# Patient Record
Sex: FEMALE | Race: BLACK OR AFRICAN AMERICAN | NOT HISPANIC OR LATINO | Employment: FULL TIME | URBAN - METROPOLITAN AREA
[De-identification: names, ages, dates, MRNs, and addresses within clinical notes are randomized per-mention and may not be internally consistent; named-entity substitution may affect disease eponyms.]

---

## 2017-03-08 ENCOUNTER — HOSPITAL ENCOUNTER (EMERGENCY)
Facility: HOSPITAL | Age: 47
Discharge: HOME | End: 2017-03-08
Attending: EMERGENCY MEDICINE
Payer: MEDICAID

## 2017-03-08 VITALS
DIASTOLIC BLOOD PRESSURE: 74 MMHG | OXYGEN SATURATION: 100 % | RESPIRATION RATE: 16 BRPM | TEMPERATURE: 98 F | SYSTOLIC BLOOD PRESSURE: 143 MMHG | BODY MASS INDEX: 36.57 KG/M2 | HEART RATE: 88 BPM | WEIGHT: 233 LBS | HEIGHT: 67 IN

## 2017-03-08 DIAGNOSIS — N30.90 CYSTITIS: ICD-10-CM

## 2017-03-08 DIAGNOSIS — R10.12 LUQ ABDOMINAL PAIN: Primary | ICD-10-CM

## 2017-03-08 LAB
ALBUMIN SERPL BCP-MCNC: 3.6 G/DL
ALP SERPL-CCNC: 65 U/L
ALT SERPL W/O P-5'-P-CCNC: 16 U/L
ANION GAP SERPL CALC-SCNC: 8 MMOL/L
AST SERPL-CCNC: 13 U/L
B-HCG UR QL: NEGATIVE
BACTERIA #/AREA URNS HPF: ABNORMAL /HPF
BASOPHILS # BLD AUTO: 0.1 K/UL
BASOPHILS NFR BLD: 0.8 %
BILIRUB SERPL-MCNC: 0.2 MG/DL
BILIRUB UR QL STRIP: NEGATIVE
BUN SERPL-MCNC: 12 MG/DL
CALCIUM SERPL-MCNC: 9.8 MG/DL
CHLORIDE SERPL-SCNC: 102 MMOL/L
CLARITY UR: ABNORMAL
CO2 SERPL-SCNC: 27 MMOL/L
COLOR UR: YELLOW
CREAT SERPL-MCNC: 1 MG/DL
CTP QC/QA: YES
DIFFERENTIAL METHOD: ABNORMAL
EOSINOPHIL # BLD AUTO: 0.1 K/UL
EOSINOPHIL NFR BLD: 0.4 %
ERYTHROCYTE [DISTWIDTH] IN BLOOD BY AUTOMATED COUNT: 14.8 %
EST. GFR  (AFRICAN AMERICAN): >60 ML/MIN/1.73 M^2
EST. GFR  (NON AFRICAN AMERICAN): >60 ML/MIN/1.73 M^2
GLUCOSE SERPL-MCNC: 102 MG/DL
GLUCOSE UR QL STRIP: NEGATIVE
HCT VFR BLD AUTO: 38.6 %
HGB BLD-MCNC: 12.3 G/DL
HGB UR QL STRIP: ABNORMAL
KETONES UR QL STRIP: NEGATIVE
LEUKOCYTE ESTERASE UR QL STRIP: ABNORMAL
LIPASE SERPL-CCNC: 42 U/L
LYMPHOCYTES # BLD AUTO: 2.1 K/UL
LYMPHOCYTES NFR BLD: 13.8 %
MCH RBC QN AUTO: 26.5 PG
MCHC RBC AUTO-ENTMCNC: 31.9 %
MCV RBC AUTO: 83 FL
MICROSCOPIC COMMENT: ABNORMAL
MONOCYTES # BLD AUTO: 1 K/UL
MONOCYTES NFR BLD: 6.3 %
NEUTROPHILS # BLD AUTO: 12.2 K/UL
NEUTROPHILS NFR BLD: 78.7 %
NITRITE UR QL STRIP: POSITIVE
PH UR STRIP: 6 [PH] (ref 5–8)
PLATELET # BLD AUTO: 271 K/UL
PMV BLD AUTO: 6.7 FL
POTASSIUM SERPL-SCNC: 4 MMOL/L
PROT SERPL-MCNC: 7.1 G/DL
PROT UR QL STRIP: NEGATIVE
RBC # BLD AUTO: 4.63 M/UL
RBC #/AREA URNS HPF: 18 /HPF (ref 0–4)
SODIUM SERPL-SCNC: 137 MMOL/L
SP GR UR STRIP: 1.02 (ref 1–1.03)
SQUAMOUS #/AREA URNS HPF: 5 /HPF
URN SPEC COLLECT METH UR: ABNORMAL
UROBILINOGEN UR STRIP-ACNC: NEGATIVE EU/DL
WBC # BLD AUTO: 15.5 K/UL
WBC #/AREA URNS HPF: 12 /HPF (ref 0–5)

## 2017-03-08 PROCEDURE — 96365 THER/PROPH/DIAG IV INF INIT: CPT

## 2017-03-08 PROCEDURE — 25000003 PHARM REV CODE 250: Performed by: EMERGENCY MEDICINE

## 2017-03-08 PROCEDURE — 85025 COMPLETE CBC W/AUTO DIFF WBC: CPT

## 2017-03-08 PROCEDURE — 99284 EMERGENCY DEPT VISIT MOD MDM: CPT | Mod: 25

## 2017-03-08 PROCEDURE — 87088 URINE BACTERIA CULTURE: CPT

## 2017-03-08 PROCEDURE — 81000 URINALYSIS NONAUTO W/SCOPE: CPT

## 2017-03-08 PROCEDURE — 63600175 PHARM REV CODE 636 W HCPCS: Performed by: EMERGENCY MEDICINE

## 2017-03-08 PROCEDURE — 83690 ASSAY OF LIPASE: CPT

## 2017-03-08 PROCEDURE — 36415 COLL VENOUS BLD VENIPUNCTURE: CPT

## 2017-03-08 PROCEDURE — 96375 TX/PRO/DX INJ NEW DRUG ADDON: CPT

## 2017-03-08 PROCEDURE — 87086 URINE CULTURE/COLONY COUNT: CPT

## 2017-03-08 PROCEDURE — 81025 URINE PREGNANCY TEST: CPT | Performed by: EMERGENCY MEDICINE

## 2017-03-08 PROCEDURE — 87077 CULTURE AEROBIC IDENTIFY: CPT | Mod: 59

## 2017-03-08 PROCEDURE — 87186 SC STD MICRODIL/AGAR DIL: CPT

## 2017-03-08 PROCEDURE — 80053 COMPREHEN METABOLIC PANEL: CPT

## 2017-03-08 RX ORDER — FAMOTIDINE 10 MG/ML
20 INJECTION INTRAVENOUS
Status: COMPLETED | OUTPATIENT
Start: 2017-03-08 | End: 2017-03-08

## 2017-03-08 RX ORDER — KETOROLAC TROMETHAMINE 30 MG/ML
10 INJECTION, SOLUTION INTRAMUSCULAR; INTRAVENOUS
Status: COMPLETED | OUTPATIENT
Start: 2017-03-08 | End: 2017-03-08

## 2017-03-08 RX ORDER — CALC/MAG/B COMPLEX/D3/HERB 61
15 TABLET ORAL DAILY
Qty: 30 CAPSULE | Refills: 0 | Status: SHIPPED | OUTPATIENT
Start: 2017-03-08 | End: 2021-04-20

## 2017-03-08 RX ORDER — ALPRAZOLAM 1 MG/1
1 TABLET ORAL 2 TIMES DAILY
COMMUNITY
End: 2019-12-04

## 2017-03-08 RX ORDER — HALOPERIDOL 5 MG/ML
2.5 INJECTION INTRAMUSCULAR
Status: COMPLETED | OUTPATIENT
Start: 2017-03-08 | End: 2017-03-08

## 2017-03-08 RX ORDER — DIPHENHYDRAMINE HYDROCHLORIDE 50 MG/ML
25 INJECTION INTRAMUSCULAR; INTRAVENOUS
Status: COMPLETED | OUTPATIENT
Start: 2017-03-08 | End: 2017-03-08

## 2017-03-08 RX ORDER — ONDANSETRON 4 MG/1
4 TABLET, ORALLY DISINTEGRATING ORAL EVERY 8 HOURS PRN
Qty: 12 TABLET | Refills: 0 | Status: SHIPPED | OUTPATIENT
Start: 2017-03-08 | End: 2019-12-04

## 2017-03-08 RX ORDER — AMOXICILLIN AND CLAVULANATE POTASSIUM 875; 125 MG/1; MG/1
1 TABLET, FILM COATED ORAL 2 TIMES DAILY
Qty: 14 TABLET | Refills: 0 | Status: SHIPPED | OUTPATIENT
Start: 2017-03-08 | End: 2017-03-15

## 2017-03-08 RX ADMIN — HALOPERIDOL LACTATE 2.5 MG: 5 INJECTION, SOLUTION INTRAMUSCULAR at 09:03

## 2017-03-08 RX ADMIN — FAMOTIDINE 20 MG: 10 INJECTION INTRAVENOUS at 08:03

## 2017-03-08 RX ADMIN — LIDOCAINE HYDROCHLORIDE: 20 SOLUTION ORAL; TOPICAL at 08:03

## 2017-03-08 RX ADMIN — CEFTRIAXONE 1 G: 1 INJECTION, SOLUTION INTRAVENOUS at 08:03

## 2017-03-08 RX ADMIN — DIPHENHYDRAMINE HYDROCHLORIDE 25 MG: 50 INJECTION, SOLUTION INTRAMUSCULAR; INTRAVENOUS at 09:03

## 2017-03-08 RX ADMIN — KETOROLAC TROMETHAMINE 10 MG: 30 INJECTION, SOLUTION INTRAMUSCULAR at 09:03

## 2017-03-08 NOTE — ED AVS SNAPSHOT
[facility] [address]  [facility] [address]               Steph Epstein   3/8/2017  7:10 PM   ED    Description:  Female : 1970   Department:  [facility]           Your Care was Coordinated By:     Provider Role From To    Tez Evans MD Attending Provider 17 1420 --      Reason for Visit     Abdominal Pain           Diagnoses this Visit        Comments    LUQ abdominal pain    -  Primary     Cystitis           ED Disposition     ED Disposition Condition Comment    Discharge             To Do List           Follow-up Information     Follow up with Concepcion Oh NP. Schedule an appointment as soon as possible for a visit in 3 days.    Specialty:  Family Medicine    Why:  return to the ED, As needed, If symptoms worsen    Contact information:    [address]  [facility] [id number] [phone]          Follow up with Efe Cramer MD. Schedule an appointment as soon as possible for a visit in 1 week.    Specialty:  Gastroenterology    Contact information:    [address] [facility]  [address]  [facility] [id number] [phone]         These Medications        Disp Refills Start End    lansoprazole (PREVACID 24HR) 15 MG capsule 30 capsule 0 3/8/2017 2017    Take 1 capsule (15 mg total) by mouth once daily. - Oral    ondansetron (ZOFRAN-ODT) 4 MG TbDL 12 tablet 0 3/8/2017     Take 1 tablet (4 mg total) by mouth every 8 (eight) hours as needed (nausea/vomiting). - Oral    amoxicillin-clavulanate 875-125mg (AUGMENTIN) 875-125 mg per tablet 14 tablet 0 3/8/2017 3/15/2017    Take 1 tablet by mouth 2 (two) times daily. - Oral      [facility]     [facility] On Call Nurse Care Line -  Assistance  Registered nurses in the [facility] On Call Center provide clinical advisement, health education, appointment booking, and other advisory services.  Call for this free service at [phone].             Medications            Message regarding Medications     Verify the changes and/or additions to your medication regime listed below are the same as discussed with your clinician today.  If any of these changes or additions are incorrect, please notify your healthcare provider.        START taking these NEW medications        Refills    lansoprazole (PREVACID 24HR) 15 MG capsule 0    Sig: Take 1 capsule (15 mg total) by mouth once daily.    Class: Print    Route: Oral    ondansetron (ZOFRAN-ODT) 4 MG TbDL 0    Sig: Take 1 tablet (4 mg total) by mouth every 8 (eight) hours as needed (nausea/vomiting).    Class: Print    Route: Oral    amoxicillin-clavulanate 875-125mg (AUGMENTIN) 875-125 mg per tablet 0    Sig: Take 1 tablet by mouth 2 (two) times daily.    Class: Print    Route: Oral      These medications were administered today        Dose Freq    (pyxis) gi cocktail (mylanta 30 mL, lidocaine 2 % viscous 10 mL, dicyclomine 10 mL) 50 mL  ED 1 Time    Sig: Take by mouth ED 1 Time.    Class: Normal    Route: Oral    cefTRIAXone (ROCEPHIN) 1 g in dextrose 5 % 50 mL IVPB 1 g ED 1 Time    Sig: Inject 50 mLs (1 g total) into the vein ED 1 Time.    Class: Normal    Route: Intravenous    famotidine (PF) 20 mg/2 mL injection 20 mg 20 mg ED 1 Time    Sig: Inject 2 mLs (20 mg total) into the vein ED 1 Time.    Class: Normal    Route: Intravenous    ketorolac injection 10 mg 10 mg ED 1 Time    Sig: Inject 10 mg into the vein ED 1 Time.    Class: Normal    Route: Intravenous    haloperidol lactate injection 2.5 mg 2.5 mg ED 1 Time    Sig: Inject 0.5 mLs (2.5 mg total) into the vein ED 1 Time.    Class: Normal    Route: Intravenous    diphenhydrAMINE injection 25 mg 25 mg ED 1 Time    Sig: Inject 0.5 mLs (25 mg total) into the vein ED 1 Time.    Class: Normal    Route: Intravenous      STOP taking these medications     diazepam (VALIUM) 5 MG tablet     escitalopram (LEXAPRO) 20 MG tablet Take 20 mg by mouth once daily.    eszopiclone (LUNESTA) 2 MG  "Tab Take 3 mg by mouth every evening.           Verify that the below list of medications is an accurate representation of the medications you are currently taking.  If none reported, the list may be blank. If incorrect, please contact your healthcare provider. Carry this list with you in case of emergency.           Current Medications     alprazolam (XANAX) 1 MG tablet Take 1 mg by mouth 2 (two) times daily.    fluoxetine (PROZAC) 40 MG capsule     lamotrigine (LAMICTAL) 100 MG tablet Take 100 mg by mouth once daily.    oxycodone-acetaminophen 5-325 mg (PERCOCET) 5-325 mg per tablet Take 1 tablet by mouth every 6 (six) hours as needed for Pain. Do not take and drive car, drink alcohol, operate heavy machinery do not take tylenol/acetominophen.    zolpidem (AMBIEN) 10 mg Tab     amoxicillin-clavulanate 875-125mg (AUGMENTIN) 875-125 mg per tablet Take 1 tablet by mouth 2 (two) times daily.    lansoprazole (PREVACID 24HR) 15 MG capsule Take 1 capsule (15 mg total) by mouth once daily.    ondansetron (ZOFRAN-ODT) 4 MG TbDL Take 1 tablet (4 mg total) by mouth every 8 (eight) hours as needed (nausea/vomiting).           Clinical Reference Information           Your Vitals Were     BP Pulse Temp Resp Height Weight    143/74 (BP Location: Right arm, Patient Position: Sitting) 88 98.3 °F (36.8 °C) (Oral) 16 5' 7" (1.702 m) 105.7 kg (233 lb)    SpO2 BMI             100% 36.49 kg/m2         Allergies as of 3/8/2017     No Known Allergies      Immunizations Administered on Date of Encounter - 3/8/2017     None      ED Micro, Lab, POCT     Start Ordered       Status Ordering Provider    03/08/17 2040 03/08/17 2039  Urine culture **CANNOT BE ORDERED STAT**  Once      In process     03/08/17 1927 03/08/17 1927  Urinalysis  STAT      Final result     03/08/17 1927 03/08/17 1927  Comprehensive Metabolic Panel (CMP)  STAT      Final result     03/08/17 1927 03/08/17 1927  Complete Blood Count (CBC)  STAT      Final result     " 03/08/17 1927 03/08/17 1927  Lipase  STAT      Final result     03/08/17 1927 03/08/17 1927  POCT urine pregnancy  Once      Final result     03/08/17 1927 03/08/17 1927  Urinalysis Microscopic  Once      Final result       ED Imaging Orders     None      Discharge References/Attachments     URINARY TRACT INFECTIONS (UTIS), UNDERSTANDING (ENGLISH)      [facility] Sign-Up     Activating your [facility] account is as easy as 1-2-3!     1) Visit [email or web address], select Sign Up Now, enter this activation code and your date of birth, then select Next.  [email or web address]  Expires: 4/22/2017  9:51 PM      2) Create a username and password to use when you visit [facility] in the future and select a security question in case you lose your password and select Next.    3) Enter your e-mail address and click Sign Up!    Additional Information  If you have questions, please e-mail [email or web address] or call [phone] to talk to our [facility] staff. Remember, [facility] is NOT to be used for urgent needs. For medical emergencies, dial 911.          [facility] complies with applicable Federal civil rights laws and does not discriminate on the basis of race, color, national origin, age, disability, or sex.        Language Assistance Services     ATTENTION: Language assistance services are available, free of charge. Please call [phone].      ATENCIÓN: Si habla español, tiene a larsen disposición servicios gratuitos de asistencia lingüística. Llame al [phone].     CHÚ Ý: N?u b?n nói Ti?ng Vi?t, có các d?ch v? h? tr? ngôn ng? mi?n phí dành cho b?n. G?i s? [phone].

## 2017-03-09 NOTE — ED PROVIDER NOTES
"Encounter Date: 3/8/2017    SCRIBE #1 NOTE: I, Alyssa Ng, am scribing for, and in the presence of, Dr. Evans.       History     Chief Complaint   Patient presents with    Abdominal Pain     intermittent x 2 weeks mid abdomen with nausea; anxiety     Review of patient's allergies indicates:  No Known Allergies  HPI Comments: 3/8/2017  7:22 PM     Chief Complaint: Abdominal pain    The patient is a 46 y.o. female with pmhx of depression and anxiety who presents with "stomach feels like it has a hole in it". Patient c/o gradual onset of intermittent burning pain to the upper abdomen for 2 weeks. Her pain progressively worsened today. She has taken percocet which gave her mild relief. Associated with nausea. No vomiting, diarrhea, fever, blood in stool, hematuria or dysuria. Pt reports she has been anxious lately because her sister was diagnosed with breast cancer recently. Pt was recently prescribed xanax, lamictal and ambien by her pmd. She took xanax earlier today but states she is still anxious. Shx of breast surgery.    The history is provided by the patient.     Past Medical History:   Diagnosis Date    Anxiety     Depression     Smoker      Past Surgical History:   Procedure Laterality Date    BREAST SURGERY       Family History   Problem Relation Age of Onset    Breast cancer Neg Hx     Colon cancer Neg Hx     Ovarian cancer Neg Hx      Social History   Substance Use Topics    Smoking status: Current Some Day Smoker    Smokeless tobacco: None    Alcohol use Yes      Comment: occasionally     Review of Systems   Constitutional: Negative for appetite change, chills and fever.   HENT: Negative for congestion, rhinorrhea and sore throat.    Respiratory: Negative for cough and shortness of breath.    Cardiovascular: Negative for chest pain.   Gastrointestinal: Positive for abdominal pain and nausea. Negative for blood in stool, diarrhea and vomiting.   Genitourinary: Negative for dysuria and " hematuria.   Musculoskeletal: Negative for back pain and myalgias.   Skin: Negative for rash.   Neurological: Negative for weakness and numbness.   Hematological: Does not bruise/bleed easily.   Psychiatric/Behavioral: The patient is nervous/anxious.    All other systems reviewed and are negative.      Physical Exam   Initial Vitals   BP Pulse Resp Temp SpO2   03/08/17 1854 03/08/17 1854 03/08/17 1854 03/08/17 1854 03/08/17 1854   143/74 88 16 98.3 °F (36.8 °C) 100 %     Physical Exam    Nursing note and vitals reviewed.  Constitutional: No distress.   HENT:   Head: Normocephalic and atraumatic.   Mouth/Throat: Oropharynx is clear and moist and mucous membranes are normal.   Eyes: EOM are normal. Pupils are equal, round, and reactive to light.   Neck: Normal range of motion.   Cardiovascular: Normal rate, regular rhythm, normal heart sounds and intact distal pulses. Exam reveals no gallop and no friction rub.    No murmur heard.  Pulmonary/Chest: Breath sounds normal. She has no wheezes. She has no rhonchi. She has no rales.   Abdominal: Soft. She exhibits no distension. There is tenderness (mild TTP) in the epigastric area. There is no rebound and no guarding.   Musculoskeletal: Normal range of motion. She exhibits no edema.   Neurological: She is alert and oriented to person, place, and time.   Skin: Skin is dry. No rash noted. No erythema.   Psychiatric: Her mood appears anxious.         ED Course   Procedures  Labs Reviewed - No data to display          Medical Decision Making:   Initial Assessment:   46-year-old female presented with a chief complaint of abdominal pain.  Differential Diagnosis:   Initial differential diagnosis included but not limited to Pancreatitis, gastritis, peptic ulcer disease, and anxiety.  Clinical Tests:   Lab Tests: Ordered and Reviewed  ED Management:  The patient was emergently evaluated in the ED, her evaluation was significant for middle-aged female with mild epigastric and left  upper quadrant tenderness.  There is no rebound or guarding noted.  The patient's labs were significant for an infected urine and a mildly elevated white blood cell count.  The patient's EKG showed no acute abnormalities per my independent interpretation.  The patient's symptoms were treated with IV medications, by mouth medications, and IV fluids.  The patient had improvement in her symptoms after treatment.  The etiology for symptoms could be peptic ulcer disease.  They can also be related to anxiety, secondary to her sister's recent health scare.  She is stable for discharge to home.  She will be discharged home with by mouth Augmentin, by mouth Prevacid, and ODT Zofran.  She is referred to GI for follow-up and further care.            Scribe Attestation:   Scribe #1: I performed the above scribed service and the documentation accurately describes the services I performed. I attest to the accuracy of the note.    Attending Attestation:           Physician Attestation for Scribe:  Physician Attestation Statement for Scribe #1: I, Dr. Evans, reviewed documentation, as scribed by Alyssa Ng in my presence, and it is both accurate and complete.                 ED Course     Clinical Impression:   The primary encounter diagnosis was LUQ abdominal pain. A diagnosis of Cystitis was also pertinent to this visit.          Tez Evans MD  03/09/17 0557

## 2017-03-09 NOTE — ED NOTES
Patient identifiers for Steph Epstein checked and correct.  LOC: Patient is awake, alert, and aware of environment with an appropriate affect. Patient is oriented x 3 and speaking appropriately.  APPEARANCE: Patient resting comfortably and in no acute distress. Patient is clean and well groomed, patient's clothing is properly fastened.  SKIN: The skin is warm and dry. Patient has normal skin turgor and moist mucus membrances. Skin is intact; no bruising or breakdown noted.  MUSCULOSKELETAL: Patient is moving all extremities well, no obvious deformities noted. Pulses intact.   RESPIRATORY: Airway is open and patent. Respirations are spontaneous and non-labored with normal effort and rate.  CARDIAC: Patient has a normal rate and rhythm. No peripheral edema noted. Capillary refill < 3 seconds.  ABDOMEN: Rounded. Bowel sounds active in all 4 quadrants. Tender in the lower quadrants, reports all over abd pain for several days, cramping, deep, sharp pain, rates pain 8/10  NEUROLOGICAL: PERRL. Facial expression is symmetrical. Hand grasps are equal bilaterally. Normal sensation in all extremities when touched with finger.  Allergies reported: Review of patient's allergies indicates:  No Known Allergies

## 2017-03-09 NOTE — ED NOTES
MD at bedside for explanation of test results, pt verbalizes understanding and reports no further questions or complaints at this time. Awaiting further instructions. Pt reports improvement in pain

## 2017-03-11 LAB — BACTERIA UR CULT: NORMAL

## 2018-10-13 ENCOUNTER — HOSPITAL ENCOUNTER (EMERGENCY)
Facility: HOSPITAL | Age: 48
Discharge: HOME | End: 2018-10-13
Attending: EMERGENCY MEDICINE
Payer: COMMERCIAL

## 2018-10-13 VITALS
RESPIRATION RATE: 18 BRPM | HEART RATE: 84 BPM | SYSTOLIC BLOOD PRESSURE: 105 MMHG | HEIGHT: 67 IN | TEMPERATURE: 98 F | BODY MASS INDEX: 35.79 KG/M2 | WEIGHT: 228 LBS | DIASTOLIC BLOOD PRESSURE: 66 MMHG | OXYGEN SATURATION: 96 %

## 2018-10-13 DIAGNOSIS — R53.1 WEAKNESS: ICD-10-CM

## 2018-10-13 DIAGNOSIS — N30.00 ACUTE CYSTITIS WITHOUT HEMATURIA: Primary | ICD-10-CM

## 2018-10-13 DIAGNOSIS — R07.9 CHEST PAIN: ICD-10-CM

## 2018-10-13 DIAGNOSIS — R42 LIGHT HEADEDNESS: ICD-10-CM

## 2018-10-13 LAB
ALBUMIN SERPL BCP-MCNC: 3.7 G/DL
ALP SERPL-CCNC: 72 U/L
ALT SERPL W/O P-5'-P-CCNC: 19 U/L
ANION GAP SERPL CALC-SCNC: 8 MMOL/L
AST SERPL-CCNC: 14 U/L
BACTERIA #/AREA URNS HPF: ABNORMAL /HPF
BASOPHILS # BLD AUTO: 0.01 K/UL
BASOPHILS NFR BLD: 0.1 %
BILIRUB SERPL-MCNC: 0.3 MG/DL
BILIRUB UR QL STRIP: NEGATIVE
BNP SERPL-MCNC: <10 PG/ML
BUN SERPL-MCNC: 14 MG/DL
CALCIUM SERPL-MCNC: 9.5 MG/DL
CHLORIDE SERPL-SCNC: 106 MMOL/L
CLARITY UR: CLEAR
CO2 SERPL-SCNC: 22 MMOL/L
COLOR UR: YELLOW
CREAT SERPL-MCNC: 0.9 MG/DL
DIFFERENTIAL METHOD: ABNORMAL
EOSINOPHIL # BLD AUTO: 0.1 K/UL
EOSINOPHIL NFR BLD: 1.5 %
ERYTHROCYTE [DISTWIDTH] IN BLOOD BY AUTOMATED COUNT: 14 %
EST. GFR  (AFRICAN AMERICAN): >60 ML/MIN/1.73 M^2
EST. GFR  (NON AFRICAN AMERICAN): >60 ML/MIN/1.73 M^2
GLUCOSE SERPL-MCNC: 100 MG/DL
GLUCOSE UR QL STRIP: NEGATIVE
HCT VFR BLD AUTO: 39.3 %
HGB BLD-MCNC: 13.4 G/DL
HGB UR QL STRIP: ABNORMAL
KETONES UR QL STRIP: NEGATIVE
LEUKOCYTE ESTERASE UR QL STRIP: NEGATIVE
LIPASE SERPL-CCNC: 58 U/L
LYMPHOCYTES # BLD AUTO: 2.9 K/UL
LYMPHOCYTES NFR BLD: 30.2 %
MCH RBC QN AUTO: 28.2 PG
MCHC RBC AUTO-ENTMCNC: 34.1 G/DL
MCV RBC AUTO: 83 FL
MICROSCOPIC COMMENT: ABNORMAL
MONOCYTES # BLD AUTO: 0.6 K/UL
MONOCYTES NFR BLD: 6.1 %
NEUTROPHILS # BLD AUTO: 5.9 K/UL
NEUTROPHILS NFR BLD: 61.7 %
NITRITE UR QL STRIP: POSITIVE
PH UR STRIP: 6 [PH] (ref 5–8)
PLATELET # BLD AUTO: 246 K/UL
PMV BLD AUTO: 9 FL
POCT GLUCOSE: 97 MG/DL (ref 70–110)
POTASSIUM SERPL-SCNC: 3.7 MMOL/L
PROT SERPL-MCNC: 7.7 G/DL
PROT UR QL STRIP: NEGATIVE
RBC # BLD AUTO: 4.75 M/UL
RBC #/AREA URNS HPF: 1 /HPF (ref 0–4)
SODIUM SERPL-SCNC: 136 MMOL/L
SP GR UR STRIP: 1.02 (ref 1–1.03)
SQUAMOUS #/AREA URNS HPF: 1 /HPF
TROPONIN I SERPL DL<=0.01 NG/ML-MCNC: <0.006 NG/ML
TROPONIN I SERPL DL<=0.01 NG/ML-MCNC: <0.006 NG/ML
TSH SERPL DL<=0.005 MIU/L-ACNC: 0.88 UIU/ML
TSH SERPL DL<=0.005 MIU/L-ACNC: 0.88 UIU/ML
URN SPEC COLLECT METH UR: ABNORMAL
UROBILINOGEN UR STRIP-ACNC: NEGATIVE EU/DL
WBC # BLD AUTO: 9.48 K/UL
WBC #/AREA URNS HPF: 2 /HPF (ref 0–5)

## 2018-10-13 PROCEDURE — 96374 THER/PROPH/DIAG INJ IV PUSH: CPT

## 2018-10-13 PROCEDURE — 93005 ELECTROCARDIOGRAM TRACING: CPT

## 2018-10-13 PROCEDURE — 99285 EMERGENCY DEPT VISIT HI MDM: CPT | Mod: 25

## 2018-10-13 PROCEDURE — 63600175 PHARM REV CODE 636 W HCPCS: Performed by: EMERGENCY MEDICINE

## 2018-10-13 PROCEDURE — 96361 HYDRATE IV INFUSION ADD-ON: CPT

## 2018-10-13 PROCEDURE — 81000 URINALYSIS NONAUTO W/SCOPE: CPT

## 2018-10-13 PROCEDURE — 85025 COMPLETE CBC W/AUTO DIFF WBC: CPT

## 2018-10-13 PROCEDURE — 84484 ASSAY OF TROPONIN QUANT: CPT

## 2018-10-13 PROCEDURE — 93010 ELECTROCARDIOGRAM REPORT: CPT | Mod: ,,, | Performed by: INTERNAL MEDICINE

## 2018-10-13 PROCEDURE — 25000003 PHARM REV CODE 250: Performed by: EMERGENCY MEDICINE

## 2018-10-13 PROCEDURE — 82962 GLUCOSE BLOOD TEST: CPT

## 2018-10-13 PROCEDURE — 83880 ASSAY OF NATRIURETIC PEPTIDE: CPT

## 2018-10-13 PROCEDURE — 84443 ASSAY THYROID STIM HORMONE: CPT

## 2018-10-13 PROCEDURE — 83690 ASSAY OF LIPASE: CPT

## 2018-10-13 PROCEDURE — 80053 COMPREHEN METABOLIC PANEL: CPT

## 2018-10-13 RX ORDER — KETOROLAC TROMETHAMINE 30 MG/ML
15 INJECTION, SOLUTION INTRAMUSCULAR; INTRAVENOUS
Status: COMPLETED | OUTPATIENT
Start: 2018-10-13 | End: 2018-10-13

## 2018-10-13 RX ORDER — CEPHALEXIN 500 MG/1
500 CAPSULE ORAL EVERY 12 HOURS
Qty: 14 CAPSULE | Refills: 0 | Status: SHIPPED | OUTPATIENT
Start: 2018-10-13 | End: 2018-10-20

## 2018-10-13 RX ORDER — DIAZEPAM 5 MG/1
5 TABLET ORAL
Status: COMPLETED | OUTPATIENT
Start: 2018-10-13 | End: 2018-10-13

## 2018-10-13 RX ORDER — ASPIRIN 325 MG
325 TABLET ORAL
Status: COMPLETED | OUTPATIENT
Start: 2018-10-13 | End: 2018-10-13

## 2018-10-13 RX ADMIN — SODIUM CHLORIDE 1000 ML: 0.9 INJECTION, SOLUTION INTRAVENOUS at 09:10

## 2018-10-13 RX ADMIN — DIAZEPAM 5 MG: 5 TABLET ORAL at 08:10

## 2018-10-13 RX ADMIN — KETOROLAC TROMETHAMINE 15 MG: 30 INJECTION, SOLUTION INTRAMUSCULAR at 09:10

## 2018-10-13 RX ADMIN — ASPIRIN 325 MG ORAL TABLET 325 MG: 325 PILL ORAL at 08:10

## 2018-10-13 NOTE — ED PROVIDER NOTES
"Encounter Date: 10/13/2018    SCRIBE #1 NOTE: I, Kitty Brewster, am scribing for, and in the presence of,  Giselle Duran MD. I have scribed the following portions of the note - Other sections scribed: HPI, ROS.       History     Chief Complaint   Patient presents with    Chest Pain     pt here with multiple complaints including chest pain, weakness, ext weakness, neck pain, headache. reports chest pain began 15min pta.     Fatigue     CC: Chest Pain, Fatigue    49 y/o female, smoker, with PMHx of anxiety and depression presents to ED for emergent evaluation of chest pain and fatigue that began this morning.  Pt reports that she woke up this morning "feeling fine" and drove to work.  She states that she began to feel sharp, intermittent chest pain (2-3 second episodes), light-headedness, and "like my coordination was off" as she drove into the parking lot at work.  She states she had difficulty parking.  Pt also c/o headache, clammy and cold hands and feet, and "feeling like my L shoulder and neck are pulling to the left."  She also states that she feels like her finger tips feel like leather.  She also reports bilateral eye soreness and dryness.  Pt denies hx of similar symptoms.  Pt reports she is compliant with her medications and took them this morning. She denies LOC, SOB, congestion, cough, vomiting, hematochezia, dysuria, and vaginal discharge. No other symptoms reported.    Of note, patient has her daughters at bedside.  She asked him several times in the interview to remind her of the symptoms that she initially complained of (for instance, she asked her daughter what she initially said about her chest pain "did I say it was sharp?")      The history is provided by the patient. No  was used.     Review of patient's allergies indicates:  No Known Allergies  Past Medical History:   Diagnosis Date    Anxiety     Depression     Smoker      Past Surgical History:   Procedure Laterality " Date    BREAST SURGERY       Family History   Problem Relation Age of Onset    Breast cancer Neg Hx     Colon cancer Neg Hx     Ovarian cancer Neg Hx      Social History     Tobacco Use    Smoking status: Current Some Day Smoker   Substance Use Topics    Alcohol use: Yes     Comment: occasionally    Drug use: No     Review of Systems   Constitutional: Positive for fatigue. Negative for chills and fever.   HENT: Negative for congestion, ear pain, rhinorrhea and sore throat.    Eyes: Negative for pain.   Respiratory: Negative for cough and shortness of breath.    Cardiovascular: Positive for chest pain.   Gastrointestinal: Negative for abdominal pain, blood in stool, diarrhea and vomiting.   Genitourinary: Negative for dysuria and vaginal bleeding.   Musculoskeletal:        (+) L shoulder pain   Skin: Negative for rash.   Neurological: Positive for light-headedness, numbness (Feels like her whole body is numb) and headaches. Negative for seizures and syncope.       Physical Exam     Initial Vitals [10/13/18 0723]   BP Pulse Resp Temp SpO2   (!) 147/79 83 18 98.7 °F (37.1 °C) 100 %      MAP       --         Physical Exam    Constitutional: She appears well-developed and well-nourished. She is not diaphoretic. No distress.   HENT:   Head: Normocephalic and atraumatic.   Mouth/Throat: Oropharynx is clear and moist. No oropharyngeal exudate.   Eyes: Conjunctivae and EOM are normal. Pupils are equal, round, and reactive to light. Right eye exhibits no discharge. Left eye exhibits no discharge.   Neck: Neck supple.   Cardiovascular: Normal rate and regular rhythm.   Pulses:       Radial pulses are 2+ on the right side, and 2+ on the left side.   Pulmonary/Chest: Breath sounds normal. No respiratory distress. She has no wheezes. She has no rhonchi. She has no rales.   Abdominal: Soft. Bowel sounds are normal. She exhibits no distension. There is tenderness (Reports burning sensation in the epigastric on palpation).    Musculoskeletal:        Cervical back: She exhibits no bony tenderness.   Patient reports tightness along left platysma all region,   Neurological: She is alert and oriented to person, place, and time. She has normal strength. No cranial nerve deficit or sensory deficit. GCS score is 15. GCS eye subscore is 4. GCS verbal subscore is 5. GCS motor subscore is 6.   Skin: Skin is warm and dry.         ED Course   Procedures  Labs Reviewed   CBC W/ AUTO DIFFERENTIAL - Abnormal; Notable for the following components:       Result Value    MPV 9.0 (*)     All other components within normal limits   COMPREHENSIVE METABOLIC PANEL - Abnormal; Notable for the following components:    CO2 22 (*)     All other components within normal limits   URINALYSIS, REFLEX TO URINE CULTURE - Abnormal; Notable for the following components:    Occult Blood UA 1+ (*)     Nitrite, UA Positive (*)     All other components within normal limits    Narrative:     Preferred Collection Type->Urine, Clean Catch   URINALYSIS MICROSCOPIC - Abnormal; Notable for the following components:    Bacteria, UA Few (*)     All other components within normal limits    Narrative:     Preferred Collection Type->Urine, Clean Catch   LIPASE   TROPONIN I   TSH   B-TYPE NATRIURETIC PEPTIDE   TSH   TROPONIN I   POCT GLUCOSE        ECG Results          EKG 12-lead (Preliminary result)  Result time 10/13/18 08:47:38    ED Interpretation by Giselle Duran MD (10/13/18 08:47:38)    Sinus rhythm, rate 87 beats per minute, no ST elevation or T-wave inversions, normal WI interval,                             Imaging Results          CT Head Without Contrast (Final result)  Result time 10/13/18 12:32:13    Final result by Alejandro Chiu DO (10/13/18 12:32:13)                 Impression:      Unremarkable noncontrast CT head specifically without evidence for acute intracranial hemorrhage.  Clinical correlation and further evaluation as  warranted.      Electronically signed by: Alejandro Chiu DO  Date:    10/13/2018  Time:    12:32             Narrative:    EXAMINATION:  CT HEAD WITHOUT CONTRAST    CLINICAL HISTORY:  Syncope/fainting;    TECHNIQUE:  Multiple sequential 5 mm axial images of the head without contrast.  Coronal and sagittal reformatted imaging from the axial acquisition.    COMPARISON:  None    FINDINGS:  There is no evidence for acute intracranial hemorrhage or sulcal effacement.  The ventricles are normal in size without hydrocephalus.  There is no midline shift or mass effect.  Visualized paranasal sinuses and mastoid air cells are clear.                               X-Ray Chest PA And Lateral (Final result)  Result time 10/13/18 10:19:19    Final result by Alejandro Chiu DO (10/13/18 10:19:19)                 Impression:      See above      Electronically signed by: Alejandro Chiu DO  Date:    10/13/2018  Time:    10:19             Narrative:    EXAMINATION:  XR CHEST PA AND LATERAL    TECHNIQUE:  PA and lateral views of the chest were performed.    COMPARISON:  None    FINDINGS:  The lungs are clear.   No pleural effusion or pneumothorax.  Heart size within normal limits.    Visualized osseous structures grossly intact.                                 Medical Decision Making:   Initial Assessment:   48-year-old female presenting with multiple complaints. She seems to be most concerned with episode of lightheadedness that occurred while driving to work.  However, she does report multiple other complaints including chest pain, epigastric burning sensation, left-sided neck and shoulder tightness, feeling leathery skin in her fingers, feeling soreness in her eyes and dry eyes.  She has a normal neurologic exam.  She has a normal cardiopulmonary exam.  Her vital signs are reassuring.  Differential includes electrolyte disturbance versus symptomatic anemia versus dysrhythmia versus thyroid abnormality versus UTI.  I have a low concern  for ACS, however given her episode of lightheadedness and intermittent chest pain, this is a consideration.  No shortness of breath to suggest PE, her chest pain is not consistent with a pulmonary embolus, her Wells risk is low.  Workup initiated with labs including troponin, urinalysis, EKG, chest x-ray.  I will treat with Valium, aspirin prophylaxis.  ED Management:  Patient complaining of left-sided neck tightness.  First troponin is negative. Low suspicion for ACS, therefore will treat with a dose of Toradol.  Labs reviewed show evidence of a urinary tract infection.    Update:  CT head ordered as patient had reported previous TIA and feeling like her speech was off.  Again, I do not appreciate any focal neural deficits, I did not appreciate any abnormalities in her speech. CT head is negative for acute finding.  Repeat troponin is negative. At this time will discharge patient.  I have given her the name of a neurologist to follow up with and advised her to follow up with her primary care physician early next week for recheck.  Will treat with Keflex for cystitis.  Patient states she understands and agrees with plan.    Additional MDM:     Well's Criteria Score:  -Clinical symptoms of DVT (leg swelling, pain with palpation) = 0.0  -Other diagnosis less likely than pulmonary embolism =            0.0  -Heart Rate >100 =   0.0  -Immobilization (= or > than 3 days) or surgery in the previous 4 weeks = 0.0  -Previous DVT/PE = 0.0  -Hemoptysis =          0.0  -Malignancy =           0.0  Well's Probability Score =    0               Scribe Attestation:   Scribe #1: I performed the above scribed service and the documentation accurately describes the services I performed. I attest to the accuracy of the note.    Attending Attestation:           Physician Attestation for Scribe:  Physician Attestation Statement for Scribe #1: I, Giselle Duran MD, reviewed documentation, as scribed by Kitty Brewster in my presence, and  it is both accurate and complete.                 ED Course as of Oct 13 1249   Sat Oct 13, 2018   1110 I went to reassess patient, she is resting comfortably in bed.  Patient now states that she feels like she is babbling and having a hard time with her speech.  Her speech is normal and she is able to answer questions appropriately.  I do not appreciate any word-finding difficulty, dysarthria, on her exam.  I have ordered a CT head as she reports she has had a TIA in the past.  I reviewed patient's laboratory analysis, it labs within acceptable limits.  Urinalysis with nitrite.  If CT head and repeat troponin are normal, will discharge with outpatient Neurology follow up, primary care follow-up for further evaluation.  [LH]      ED Course User Index  [LH] Giselle Duran MD     Clinical Impression:   The primary encounter diagnosis was Acute cystitis without hematuria. Diagnoses of Chest pain, Weakness, and Light headedness were also pertinent to this visit.                             Giselle Duran MD  10/13/18 1247

## 2018-10-13 NOTE — ED TRIAGE NOTES
"Pt states that she came to ED today because she was driving to work, and started feeling weak, and tingling all over with intermittent chest pain.  Pt states that her head hurts, the left side of her neck hurts, and everything, "feels funny."  She states that her mouth is dry, and she has some light sensitivity.  Pt's VSS at this time, she is in no acute distress at this time.   "

## 2018-12-14 ENCOUNTER — OFFICE VISIT (OUTPATIENT)
Dept: OBSTETRICS AND GYNECOLOGY | Facility: CLINIC | Age: 48
End: 2018-12-14
Payer: COMMERCIAL

## 2018-12-14 VITALS
SYSTOLIC BLOOD PRESSURE: 118 MMHG | WEIGHT: 239.19 LBS | BODY MASS INDEX: 37.54 KG/M2 | HEIGHT: 67 IN | DIASTOLIC BLOOD PRESSURE: 84 MMHG

## 2018-12-14 DIAGNOSIS — Z11.3 SCREEN FOR STD (SEXUALLY TRANSMITTED DISEASE): ICD-10-CM

## 2018-12-14 DIAGNOSIS — R37 SEXUAL DYSFUNCTION: ICD-10-CM

## 2018-12-14 DIAGNOSIS — Z01.419 ENCOUNTER FOR GYNECOLOGICAL EXAMINATION WITHOUT ABNORMAL FINDING: Primary | ICD-10-CM

## 2018-12-14 PROCEDURE — 99999 PR PBB SHADOW E&M-EST. PATIENT-LVL III: CPT | Mod: PBBFAC,,, | Performed by: OBSTETRICS & GYNECOLOGY

## 2018-12-14 PROCEDURE — 99396 PREV VISIT EST AGE 40-64: CPT | Mod: S$GLB,,, | Performed by: OBSTETRICS & GYNECOLOGY

## 2018-12-14 PROCEDURE — 87491 CHLMYD TRACH DNA AMP PROBE: CPT

## 2018-12-15 LAB
C TRACH DNA SPEC QL NAA+PROBE: NOT DETECTED
N GONORRHOEA DNA SPEC QL NAA+PROBE: NOT DETECTED

## 2018-12-17 NOTE — PROGRESS NOTES
12/14/2018    Chlamydia, Amplified DNA Not Detected   N gonorrhoeae, amplified DNA Not Detected       PT HERE FOR ANNUAL.  WANTS GC/CT.  HAS NO SEXUAL DESIRE. ON SSRI AND IS WORKING WITH PCP TO MODIFY THIS.    ROS:  GENERAL: No fever, chills, fatigability or weight loss.  VULVAR: No pain, no lesions and no itching.  VAGINAL: No relaxation, no itching, no discharge, no abnormal bleeding and no lesions.  ABDOMEN: No abdominal pain. Denies nausea. Denies vomiting. No diarrhea. No constipation  BREAST: Denies pain. No lumps. No discharge.  URINARY: No incontinence, no nocturia, no frequency and no dysuria.  CARDIOVASCULAR: No chest pain. No shortness of breath. No leg cramps.  NEUROLOGICAL: No headaches. No vision changes.  The remainder of the review of systems was negative.    PE:  General Appearance: overweight And Well developed. Well nourished. In no acute distress.  Vulva: Lesions: No.  Urethral Meatus: Normal size. Normal location. No lesions. No prolapse.  Urethra: No masses. No tenderness. No prolapse. No scarring.  Bladder: No masses. No tenderness.  Vagina: Mucosa NI:yes discharge no, atrophy no, cystocele no or rectocele no.  Cervix: Lesion: no  Stenotic: no Cervical motion tenderness: no  Uterus: Uterus size: 6 weeks. Support good. Uterus size: Normal  Adnexa: Masses: No Tenderness: No CDS Nodularity: No  Abdomen: overweight No masses. No tenderness.  Breasts: No bilateral masses. No bilateral discharge. No bilateral tenderness. No bilateral fibrocystic changes.  Neck: No thyroid enlargement. No thyroid masses.  Skin: Rashes: No      PROCEDURES:    PLAN:     DIAGNOSIS:  1. Encounter for gynecological examination without abnormal finding    2. Sexual dysfunction    3. Screen for STD (sexually transmitted disease)        MEDICATIONS & ORDERS:  Orders Placed This Encounter    C. trachomatis/N. gonorrhoeae by AMP DNA    Mammo Digital Screening Bilat       Patient was counseled today on the new ACS  guidelines for cervical cytology screening as well as the current recommendations for breast cancer screening. She was counseled to follow up with her PCP for other routine health maintenance. Counseling session lasted approximately 10 minutes, and all her questions were answered.     20 MIN D/W PT ON CAUSES AND TX OF SEXUAL DYSFUNCTION. THINKS HER PCP MAY GIVE TEST PELLETS.    FOLLOW-UP: With me in 12 month

## 2018-12-27 ENCOUNTER — TELEPHONE (OUTPATIENT)
Dept: OBSTETRICS AND GYNECOLOGY | Facility: CLINIC | Age: 48
End: 2018-12-27

## 2018-12-27 NOTE — TELEPHONE ENCOUNTER
----- Message from Yumiko Cortez sent at 12/27/2018 10:47 AM CST -----  Name of Who is Calling: LOYD PRITCHETT [[id number]]    What is the request in detail: Pt would like to discuss pressure in the lower part of her stomach and her annual results.       Can the clinic reply by [facility]:   No       What Number [facility] Back [facility]: #[phone]#

## 2019-01-11 ENCOUNTER — TELEPHONE (OUTPATIENT)
Dept: OBSTETRICS AND GYNECOLOGY | Facility: CLINIC | Age: 49
End: 2019-01-11

## 2019-01-11 NOTE — TELEPHONE ENCOUNTER
----- Message from Jeanmarie Garcia sent at 1/11/2019  2:37 PM CST -----  PT HAS A UTI SHE SAYS THIS IS HER 2ND TIME CALLING PLEASE CALL HER TODAY [phone]

## 2019-01-11 NOTE — TELEPHONE ENCOUNTER
Returned patient's phone call. Patient states she would like to have rx call in for UTI, and have her pap results sent to her primary care physician. Patient was informed that Dr. Solis will be notified and will get back with her regarding this matter. Patient states she will use otc until rx is called in.    Fax to primary: [phone]

## 2019-01-12 ENCOUNTER — TELEPHONE (OUTPATIENT)
Dept: OBSTETRICS AND GYNECOLOGY | Facility: HOSPITAL | Age: 49
End: 2019-01-12

## 2019-01-12 RX ORDER — NITROFURANTOIN (MACROCRYSTALS) 100 MG/1
100 CAPSULE ORAL EVERY 12 HOURS
Qty: 10 CAPSULE | Refills: 0 | Status: SHIPPED | OUTPATIENT
Start: 2019-01-12 | End: 2019-01-17

## 2019-01-12 NOTE — TELEPHONE ENCOUNTER
Spoke with patient regarding prescription for UTI. Per patient it was supposed to be faxed to her pharmacy by Dr. Solis's office yesterday and was not received.     Per chart review patient has U/A with +nitrite and symptoms. Note from staff stating it would be sent to her requested pharmacy .     Patient denies fever, nausea, vomiting, flank pain. Allergies reviewed. Request filled and macrobid sent for one time fill.     Jasmyn Hogan MD  OBGYN, PGY-1

## 2019-01-14 NOTE — TELEPHONE ENCOUNTER
Contacted patient to inform her that, per Dr. Solis, she needs to submit a US before he can send prescription; no answer. Left message to have patient return call to clinic.

## 2019-08-30 ENCOUNTER — TELEPHONE (OUTPATIENT)
Dept: BARIATRICS | Facility: CLINIC | Age: 49
End: 2019-08-30

## 2019-08-30 NOTE — TELEPHONE ENCOUNTER
----- Message from July Galindo sent at 8/29/2019 10:58 AM CDT -----  Please call pt for gastric sleeve

## 2019-12-03 ENCOUNTER — TELEPHONE (OUTPATIENT)
Dept: OBSTETRICS AND GYNECOLOGY | Facility: CLINIC | Age: 49
End: 2019-12-03

## 2019-12-03 NOTE — TELEPHONE ENCOUNTER
Spoke to patient. Patient states she is having right side pelvic pain radiating to her back. She states the pain is 8/10 and she took ibuprofen 800 about 4 hours ago which somewhat relieved the pain. Denies vaginal bleeding, fever, chills, and urinary frequency/ pain. Offered patient walkin appointment today. Patient declined due to being at work and stated she was off tomorrow. Scheduled patient with NP tomorrow afternoon. Patient advised that if pain worsens or heavy bleeding occurs at a pad an hour to go to the ED.

## 2019-12-03 NOTE — TELEPHONE ENCOUNTER
----- Message from Bruno Cortes sent at 12/3/2019 11:16 AM CST -----  Contact: LOYD HIRSCH [[id number]]   Name of Who is Calling: LOYD HIRSCH [[id number]]     What is the request in detail: LOYD HIRSCH [[id number]] is requesting a sooner appointment  Patient is in pain  Please contact to further discuss and advise      Can the clinic reply by [facility]: no     What Number to Call Back if not in [facility]:  [phone]

## 2019-12-04 ENCOUNTER — OFFICE VISIT (OUTPATIENT)
Dept: OBSTETRICS AND GYNECOLOGY | Facility: CLINIC | Age: 49
End: 2019-12-04
Payer: COMMERCIAL

## 2019-12-04 VITALS
BODY MASS INDEX: 39.42 KG/M2 | WEIGHT: 251.13 LBS | SYSTOLIC BLOOD PRESSURE: 122 MMHG | DIASTOLIC BLOOD PRESSURE: 82 MMHG | HEIGHT: 67 IN

## 2019-12-04 DIAGNOSIS — R10.2 SUPRAPUBIC PAIN: ICD-10-CM

## 2019-12-04 DIAGNOSIS — R10.2 PELVIC PAIN: ICD-10-CM

## 2019-12-04 DIAGNOSIS — Z12.31 VISIT FOR SCREENING MAMMOGRAM: ICD-10-CM

## 2019-12-04 DIAGNOSIS — R30.0 DYSURIA: Primary | ICD-10-CM

## 2019-12-04 PROCEDURE — 99999 PR PBB SHADOW E&M-EST. PATIENT-LVL III: CPT | Mod: PBBFAC,,, | Performed by: NURSE PRACTITIONER

## 2019-12-04 PROCEDURE — 99999 PR PBB SHADOW E&M-EST. PATIENT-LVL III: ICD-10-PCS | Mod: PBBFAC,,, | Performed by: NURSE PRACTITIONER

## 2019-12-04 PROCEDURE — 99213 PR OFFICE/OUTPT VISIT, EST, LEVL III, 20-29 MIN: ICD-10-PCS | Mod: S$GLB,,, | Performed by: NURSE PRACTITIONER

## 2019-12-04 PROCEDURE — 99213 OFFICE O/P EST LOW 20 MIN: CPT | Mod: S$GLB,,, | Performed by: NURSE PRACTITIONER

## 2019-12-04 PROCEDURE — 87086 URINE CULTURE/COLONY COUNT: CPT

## 2019-12-04 RX ORDER — LAMOTRIGINE 200 MG/1
200 TABLET ORAL NIGHTLY
Refills: 5 | COMMUNITY
Start: 2019-10-08

## 2019-12-04 RX ORDER — BUPROPION HYDROCHLORIDE 150 MG/1
TABLET ORAL
Refills: 5 | COMMUNITY
Start: 2019-11-12 | End: 2021-04-20

## 2019-12-04 RX ORDER — NITROFURANTOIN 25; 75 MG/1; MG/1
100 CAPSULE ORAL 2 TIMES DAILY
Qty: 14 CAPSULE | Refills: 0 | Status: SHIPPED | OUTPATIENT
Start: 2019-12-04 | End: 2019-12-04 | Stop reason: SDUPTHER

## 2019-12-04 RX ORDER — LAMOTRIGINE 100 MG/1
100 TABLET ORAL
COMMUNITY
End: 2021-04-20

## 2019-12-04 RX ORDER — BUPROPION HYDROCHLORIDE 300 MG/1
300 TABLET ORAL DAILY
Refills: 5 | COMMUNITY
Start: 2019-11-11

## 2019-12-04 RX ORDER — NITROFURANTOIN 25; 75 MG/1; MG/1
100 CAPSULE ORAL 2 TIMES DAILY
Qty: 14 CAPSULE | Refills: 0 | Status: SHIPPED | OUTPATIENT
Start: 2019-12-04 | End: 2019-12-11

## 2019-12-04 RX ORDER — ACETAMINOPHEN AND CODEINE PHOSPHATE 300; 30 MG/1; MG/1
1-2 TABLET ORAL EVERY 4 HOURS PRN
Qty: 10 TABLET | Refills: 0 | Status: SHIPPED | OUTPATIENT
Start: 2019-12-04 | End: 2021-04-20

## 2019-12-04 RX ORDER — ERGOCALCIFEROL 1.25 MG/1
50000 CAPSULE ORAL
Refills: 5 | COMMUNITY
Start: 2019-10-07

## 2019-12-04 RX ORDER — ALPRAZOLAM 2 MG/1
2 TABLET ORAL EVERY 12 HOURS PRN
Refills: 2 | COMMUNITY
Start: 2019-11-06

## 2019-12-04 NOTE — PROGRESS NOTES
CC: Pelvic pain, dysuria    Pt is 49 y.o. here for evaluation of pelvic pain. The pain is described as pressure-like and sharp, and is located in the suprapubic area with radiation to the right back and left back. Onset was gradual occurring 10 days ago. Symptoms have been gradually worsening since. Aggravating factors: none. Alleviating factors: acetaminophen. Associated symptoms: dysuria and nausea. The patient denies chills, constipation, fever and frequency. Risk factors for pelvic/abdominal pain include IUD in place and h/o fibroids.    Reports she had a recent UTI a few weeks ago - which required 2 rounds of antibiotics and now symptoms have returned. U dip + for leukocytes, nitrates, protein and blood.  She is requesting RX for Tylenol 3 with codeine.  Tylenol and Motrin 800 not helping.        ROS:  GENERAL: Feeling well overall. Denies fever or chills.   SKIN: Denies rash or lesions.   HEAD: Denies head injury or headache.   NODES: Denies enlarged lymph nodes.   CHEST: Denies chest pain or shortness of breath.   CARDIOVASCULAR: Denies palpitations or left sided chest pain.   ABDOMEN: No abdominal pain, constipation, diarrhea, nausea, vomiting or rectal bleeding.   URINARY: No dysuria, hematuria, or burning on urination.  REPRODUCTIVE: See HPI.   BREASTS: Denies pain, lumps, or nipple discharge.   HEMATOLOGIC: No easy bruisability or excessive bleeding.   MUSCULOSKELETAL: Denies joint pain or swelling.   NEUROLOGIC: Denies syncope or weakness.   PSYCHIATRIC: Denies depression, anxiety or mood swings.    PE:   APPEARANCE: Well nourished, well developed, White female in no acute distress.  VULVA: No lesions. Normal external female genitalia.  URETHRAL MEATUS: Normal size and location, no lesions, no prolapse.  URETHRA: No masses, tenderness, or prolapse.  VAGINA: Moist. No lesions or lacerations noted. No abnormal discharge present. No odor present.   CERVIX: No lesions or discharge. No cervical motion  tenderness.   IUD strings visualized at os- 1 cm out.  UTERUS: Normal size, regular shape, mobile, + suprapubic tenderness.  ADNEXA: No tenderness. No fullness or masses palpated in the adnexal regions.   ANUS PERINEUM: Normal.        Diagnosis:  1. Dysuria    2. Pelvic pain    3. Suprapubic pain        Plan:   Urine culture  Macrobid   Pelvic US   Tylenol #3 RX sent- discussed to use PRN sparingly d/t risk of dependence.     Orders Placed This Encounter    Urine culture    US Pelvis Comp with Transvag NON-OB (xpd    nitrofurantoin, macrocrystal-monohydrate, (MACROBID) 100 MG capsule    acetaminophen-codeine 300-30mg (TYLENOL #3) 300-30 mg Tab             Follow-up PRN no resolution of symptoms.    Nargis Rich, FNP-C

## 2019-12-06 LAB
BACTERIA UR CULT: NORMAL
BACTERIA UR CULT: NORMAL

## 2021-04-05 ENCOUNTER — TELEPHONE (OUTPATIENT)
Dept: OBSTETRICS AND GYNECOLOGY | Facility: CLINIC | Age: 51
End: 2021-04-05

## 2021-04-20 ENCOUNTER — PROCEDURE VISIT (OUTPATIENT)
Dept: OBSTETRICS AND GYNECOLOGY | Facility: CLINIC | Age: 51
End: 2021-04-20
Payer: COMMERCIAL

## 2021-04-20 VITALS
HEIGHT: 67 IN | WEIGHT: 238.13 LBS | DIASTOLIC BLOOD PRESSURE: 78 MMHG | BODY MASS INDEX: 37.37 KG/M2 | SYSTOLIC BLOOD PRESSURE: 132 MMHG

## 2021-04-20 DIAGNOSIS — Z01.419 WELL WOMAN EXAM WITH ROUTINE GYNECOLOGICAL EXAM: Primary | ICD-10-CM

## 2021-04-20 DIAGNOSIS — Z30.432 ENCOUNTER FOR IUD REMOVAL: ICD-10-CM

## 2021-04-20 PROCEDURE — 87624 HPV HI-RISK TYP POOLED RSLT: CPT | Performed by: STUDENT IN AN ORGANIZED HEALTH CARE EDUCATION/TRAINING PROGRAM

## 2021-04-20 PROCEDURE — 88175 CYTOPATH C/V AUTO FLUID REDO: CPT | Performed by: STUDENT IN AN ORGANIZED HEALTH CARE EDUCATION/TRAINING PROGRAM

## 2021-04-20 PROCEDURE — 58301 REMOVE INTRAUTERINE DEVICE: CPT | Mod: S$GLB,,, | Performed by: STUDENT IN AN ORGANIZED HEALTH CARE EDUCATION/TRAINING PROGRAM

## 2021-04-20 PROCEDURE — 58301 REMOVAL OF INTRAUTERINE DEVICE-TODAY: ICD-10-PCS | Mod: S$GLB,,, | Performed by: STUDENT IN AN ORGANIZED HEALTH CARE EDUCATION/TRAINING PROGRAM

## 2021-04-20 RX ORDER — DESVENLAFAXINE SUCCINATE 50 MG/1
50 TABLET, EXTENDED RELEASE ORAL EVERY MORNING
COMMUNITY
Start: 2021-02-16

## 2021-04-24 LAB
FINAL PATHOLOGIC DIAGNOSIS: NORMAL
Lab: NORMAL

## 2021-04-26 ENCOUNTER — TELEPHONE (OUTPATIENT)
Dept: OBSTETRICS AND GYNECOLOGY | Facility: CLINIC | Age: 51
End: 2021-04-26

## 2021-04-26 LAB
HPV HR 12 DNA SPEC QL NAA+PROBE: NEGATIVE
HPV16 AG SPEC QL: NEGATIVE
HPV18 DNA SPEC QL NAA+PROBE: NEGATIVE

## 2021-04-28 ENCOUNTER — TELEPHONE (OUTPATIENT)
Dept: OBSTETRICS AND GYNECOLOGY | Facility: CLINIC | Age: 51
End: 2021-04-28

## 2021-04-28 ENCOUNTER — HOSPITAL ENCOUNTER (OUTPATIENT)
Dept: RADIOLOGY | Facility: HOSPITAL | Age: 51
Discharge: HOME | End: 2021-04-28
Attending: STUDENT IN AN ORGANIZED HEALTH CARE EDUCATION/TRAINING PROGRAM
Payer: COMMERCIAL

## 2021-04-28 VITALS — HEIGHT: 67 IN | WEIGHT: 238.13 LBS | BODY MASS INDEX: 37.37 KG/M2

## 2021-04-28 DIAGNOSIS — Z12.31 VISIT FOR SCREENING MAMMOGRAM: ICD-10-CM

## 2021-04-28 PROCEDURE — 77067 SCR MAMMO BI INCL CAD: CPT | Mod: TC,PO

## 2021-05-03 ENCOUNTER — TELEPHONE (OUTPATIENT)
Dept: OBSTETRICS AND GYNECOLOGY | Facility: CLINIC | Age: 51
End: 2021-05-03

## 2021-05-04 ENCOUNTER — TELEPHONE (OUTPATIENT)
Dept: OBSTETRICS AND GYNECOLOGY | Facility: CLINIC | Age: 51
End: 2021-05-04

## 2021-05-07 ENCOUNTER — TELEPHONE (OUTPATIENT)
Dept: OBSTETRICS AND GYNECOLOGY | Facility: CLINIC | Age: 51
End: 2021-05-07

## 2021-06-04 ENCOUNTER — TELEPHONE (OUTPATIENT)
Dept: OBSTETRICS AND GYNECOLOGY | Facility: CLINIC | Age: 51
End: 2021-06-04

## 2021-06-08 ENCOUNTER — LAB VISIT (OUTPATIENT)
Dept: LAB | Facility: OTHER | Age: 51
End: 2021-06-08
Attending: STUDENT IN AN ORGANIZED HEALTH CARE EDUCATION/TRAINING PROGRAM

## 2021-06-08 ENCOUNTER — OFFICE VISIT (OUTPATIENT)
Dept: OBSTETRICS AND GYNECOLOGY | Facility: CLINIC | Age: 51
End: 2021-06-08
Payer: COMMERCIAL

## 2021-06-08 ENCOUNTER — TELEPHONE (OUTPATIENT)
Dept: OBSTETRICS AND GYNECOLOGY | Facility: CLINIC | Age: 51
End: 2021-06-08

## 2021-06-08 VITALS
SYSTOLIC BLOOD PRESSURE: 108 MMHG | BODY MASS INDEX: 37.37 KG/M2 | DIASTOLIC BLOOD PRESSURE: 68 MMHG | WEIGHT: 238.13 LBS | HEIGHT: 67 IN

## 2021-06-08 DIAGNOSIS — Z30.9 ENCOUNTER FOR CONTRACEPTIVE MANAGEMENT, UNSPECIFIED TYPE: ICD-10-CM

## 2021-06-08 DIAGNOSIS — N93.9 ABNORMAL UTERINE BLEEDING (AUB): Primary | ICD-10-CM

## 2021-06-08 DIAGNOSIS — R23.3 EASY BRUISING: ICD-10-CM

## 2021-06-08 DIAGNOSIS — N93.9 ABNORMAL UTERINE BLEEDING (AUB): ICD-10-CM

## 2021-06-08 LAB
APTT BLDCRRT: 26.8 SEC (ref 21–32)
BASOPHILS # BLD AUTO: 0.03 K/UL (ref 0–0.2)
BASOPHILS NFR BLD: 0.4 % (ref 0–1.9)
DIFFERENTIAL METHOD: ABNORMAL
EOSINOPHIL # BLD AUTO: 0.1 K/UL (ref 0–0.5)
EOSINOPHIL NFR BLD: 1.6 % (ref 0–8)
ERYTHROCYTE [DISTWIDTH] IN BLOOD BY AUTOMATED COUNT: 14 % (ref 11.5–14.5)
HCT VFR BLD AUTO: 37.2 % (ref 37–48.5)
HGB BLD-MCNC: 11.8 G/DL (ref 12–16)
IMM GRANULOCYTES # BLD AUTO: 0.04 K/UL (ref 0–0.04)
IMM GRANULOCYTES NFR BLD AUTO: 0.5 % (ref 0–0.5)
INR PPP: 0.9 (ref 0.8–1.2)
LYMPHOCYTES # BLD AUTO: 2.5 K/UL (ref 1–4.8)
LYMPHOCYTES NFR BLD: 29.9 % (ref 18–48)
MCH RBC QN AUTO: 28.2 PG (ref 27–31)
MCHC RBC AUTO-ENTMCNC: 31.7 G/DL (ref 32–36)
MCV RBC AUTO: 89 FL (ref 82–98)
MONOCYTES # BLD AUTO: 0.7 K/UL (ref 0.3–1)
MONOCYTES NFR BLD: 8.4 % (ref 4–15)
NEUTROPHILS # BLD AUTO: 4.9 K/UL (ref 1.8–7.7)
NEUTROPHILS NFR BLD: 59.2 % (ref 38–73)
NRBC BLD-RTO: 0 /100 WBC
PLATELET # BLD AUTO: 278 K/UL (ref 150–450)
PMV BLD AUTO: 8.7 FL (ref 9.2–12.9)
PROTHROMBIN TIME: 10.1 SEC (ref 9–12.5)
RBC # BLD AUTO: 4.19 M/UL (ref 4–5.4)
WBC # BLD AUTO: 8.29 K/UL (ref 3.9–12.7)

## 2021-06-08 PROCEDURE — 85610 PROTHROMBIN TIME: CPT | Performed by: STUDENT IN AN ORGANIZED HEALTH CARE EDUCATION/TRAINING PROGRAM

## 2021-06-08 PROCEDURE — 85730 THROMBOPLASTIN TIME PARTIAL: CPT | Performed by: STUDENT IN AN ORGANIZED HEALTH CARE EDUCATION/TRAINING PROGRAM

## 2021-06-08 PROCEDURE — 36415 COLL VENOUS BLD VENIPUNCTURE: CPT | Performed by: STUDENT IN AN ORGANIZED HEALTH CARE EDUCATION/TRAINING PROGRAM

## 2021-06-08 PROCEDURE — 99999 PR PBB SHADOW E&M-EST. PATIENT-LVL III: CPT | Mod: PBBFAC,,, | Performed by: STUDENT IN AN ORGANIZED HEALTH CARE EDUCATION/TRAINING PROGRAM

## 2021-06-08 PROCEDURE — 99214 PR OFFICE/OUTPT VISIT, EST, LEVL IV, 30-39 MIN: ICD-10-PCS | Mod: S$GLB,,, | Performed by: STUDENT IN AN ORGANIZED HEALTH CARE EDUCATION/TRAINING PROGRAM

## 2021-06-08 PROCEDURE — 99214 OFFICE O/P EST MOD 30 MIN: CPT | Mod: S$GLB,,, | Performed by: STUDENT IN AN ORGANIZED HEALTH CARE EDUCATION/TRAINING PROGRAM

## 2021-06-08 PROCEDURE — 85025 COMPLETE CBC W/AUTO DIFF WBC: CPT | Performed by: STUDENT IN AN ORGANIZED HEALTH CARE EDUCATION/TRAINING PROGRAM

## 2021-06-08 PROCEDURE — 99999 PR PBB SHADOW E&M-EST. PATIENT-LVL III: ICD-10-PCS | Mod: PBBFAC,,, | Performed by: STUDENT IN AN ORGANIZED HEALTH CARE EDUCATION/TRAINING PROGRAM

## 2021-06-08 RX ORDER — LACTIC ACID, L-, CITRIC ACID MONOHYDRATE, AND POTASSIUM BITARTRATE 90; 50; 20 MG/5G; MG/5G; MG/5G
1 GEL VAGINAL
Qty: 60 G | Refills: 11 | Status: SHIPPED | OUTPATIENT
Start: 2021-06-08

## 2021-06-08 RX ORDER — IBUPROFEN 800 MG/1
TABLET ORAL
Status: ON HOLD | COMMUNITY
Start: 2021-05-25 | End: 2023-01-20 | Stop reason: CLARIF

## 2021-06-10 ENCOUNTER — HOSPITAL ENCOUNTER (OUTPATIENT)
Dept: RADIOLOGY | Facility: HOSPITAL | Age: 51
Discharge: HOME | End: 2021-06-10
Attending: STUDENT IN AN ORGANIZED HEALTH CARE EDUCATION/TRAINING PROGRAM
Payer: COMMERCIAL

## 2021-06-10 DIAGNOSIS — N93.9 ABNORMAL UTERINE BLEEDING (AUB): ICD-10-CM

## 2021-06-10 PROCEDURE — 76856 US EXAM PELVIC COMPLETE: CPT | Mod: TC

## 2021-06-10 PROCEDURE — 76830 US PELVIS COMP WITH TRANSVAG NON-OB (XPD): ICD-10-PCS | Mod: 26,,, | Performed by: RADIOLOGY

## 2021-06-10 PROCEDURE — 76830 TRANSVAGINAL US NON-OB: CPT | Mod: 26,,, | Performed by: RADIOLOGY

## 2021-06-10 PROCEDURE — 76856 US PELVIS COMP WITH TRANSVAG NON-OB (XPD): ICD-10-PCS | Mod: 26,,, | Performed by: RADIOLOGY

## 2021-06-10 PROCEDURE — 76856 US EXAM PELVIC COMPLETE: CPT | Mod: 26,,, | Performed by: RADIOLOGY

## 2021-06-25 ENCOUNTER — TELEPHONE (OUTPATIENT)
Dept: OBSTETRICS AND GYNECOLOGY | Facility: CLINIC | Age: 51
End: 2021-06-25

## 2021-06-29 ENCOUNTER — TELEPHONE (OUTPATIENT)
Dept: OBSTETRICS AND GYNECOLOGY | Facility: CLINIC | Age: 51
End: 2021-06-29

## 2021-06-29 ENCOUNTER — NURSE TRIAGE (OUTPATIENT)
Dept: ADMINISTRATIVE | Facility: CLINIC | Age: 51
End: 2021-06-29

## 2021-06-29 RX ORDER — NAPROXEN SODIUM 550 MG/1
550 TABLET ORAL 2 TIMES DAILY PRN
Qty: 30 TABLET | Refills: 2 | Status: SHIPPED | OUTPATIENT
Start: 2021-06-29 | End: 2022-06-29

## 2021-06-30 ENCOUNTER — TELEPHONE (OUTPATIENT)
Dept: OBSTETRICS AND GYNECOLOGY | Facility: CLINIC | Age: 51
End: 2021-06-30

## 2022-04-19 DIAGNOSIS — M25.561 RIGHT KNEE PAIN: Primary | ICD-10-CM

## 2022-05-17 ENCOUNTER — HOSPITAL ENCOUNTER (OUTPATIENT)
Dept: RADIOLOGY | Facility: HOSPITAL | Age: 52
Discharge: HOME | End: 2022-05-17
Attending: ORTHOPAEDIC SURGERY
Payer: COMMERCIAL

## 2022-05-17 DIAGNOSIS — M25.561 RIGHT KNEE PAIN: ICD-10-CM

## 2022-05-17 PROCEDURE — 73721 MRI JNT OF LWR EXTRE W/O DYE: CPT | Mod: TC,PO,RT

## 2022-08-24 NOTE — ED NOTES
Pt reports no relief in abd pain, MD updated, awaiting further orders   Request for ADHD Medication Refill    Chart reviewed.  Last Medication check 7/11/22.  Next appt 1/2023.    Last Rx filled on 3/30/22   Patient only takes medication during the school year    PDMP search on patient did not reveal any area of concern with stimulant prescriptions    Will send Rx electronically to pharmacy as requested.  1 month (s) supply provided.

## 2022-10-28 DIAGNOSIS — R22.0 LOCALIZED SWELLING, MASS, AND LUMP OF HEAD: Primary | ICD-10-CM

## 2022-11-08 ENCOUNTER — HOSPITAL ENCOUNTER (OUTPATIENT)
Dept: RADIOLOGY | Facility: HOSPITAL | Age: 52
Discharge: HOME | End: 2022-11-08
Attending: NURSE PRACTITIONER
Payer: COMMERCIAL

## 2022-11-08 DIAGNOSIS — R22.0 LOCALIZED SWELLING, MASS, AND LUMP OF HEAD: ICD-10-CM

## 2022-11-08 PROCEDURE — 76536 US EXAM OF HEAD AND NECK: CPT | Mod: TC,PO

## 2022-11-09 DIAGNOSIS — D23.21: Primary | ICD-10-CM

## 2022-12-02 ENCOUNTER — HOSPITAL ENCOUNTER (OUTPATIENT)
Dept: RADIOLOGY | Facility: HOSPITAL | Age: 52
Discharge: HOME | End: 2022-12-02
Attending: NURSE PRACTITIONER
Payer: COMMERCIAL

## 2022-12-02 DIAGNOSIS — D23.21: ICD-10-CM

## 2022-12-02 PROCEDURE — 72127 CT NECK SPINE W/O & W/DYE: CPT | Mod: TC

## 2022-12-02 PROCEDURE — 25500020 PHARM REV CODE 255: Performed by: NURSE PRACTITIONER

## 2022-12-02 PROCEDURE — 70470 CT HEAD/BRAIN W/O & W/DYE: CPT | Mod: TC

## 2022-12-02 RX ADMIN — IOHEXOL 100 ML: 350 INJECTION, SOLUTION INTRAVENOUS at 04:12

## 2023-01-20 PROBLEM — K57.92 DIVERTICULITIS: Status: ACTIVE | Noted: 2023-01-20

## 2023-03-29 ENCOUNTER — OCCUPATIONAL HEALTH (OUTPATIENT)
Dept: URGENT CARE | Facility: CLINIC | Age: 53
End: 2023-03-29
Payer: COMMERCIAL

## 2023-03-29 DIAGNOSIS — Z13.9 ENCOUNTER FOR SCREENING: Primary | ICD-10-CM

## 2023-03-29 LAB
COLLECTION ONLY: NORMAL
TB INDURATION - 48 HR READ: NORMAL
TB INDURATION - 72 HR READ: NORMAL
TB SKIN TEST - 48 HR READ: NORMAL
TB SKIN TEST - 72 HR READ: NORMAL

## 2023-03-29 PROCEDURE — 86580 TB INTRADERMAL TEST: CPT | Mod: S$GLB,,, | Performed by: NURSE PRACTITIONER

## 2023-03-29 PROCEDURE — 86580 POCT TB SKIN TEST: ICD-10-PCS | Mod: S$GLB,,, | Performed by: NURSE PRACTITIONER

## 2023-04-05 ENCOUNTER — OFFICE VISIT (OUTPATIENT)
Dept: URGENT CARE | Facility: CLINIC | Age: 53
End: 2023-04-05
Payer: COMMERCIAL

## 2023-04-05 VITALS
WEIGHT: 216 LBS | SYSTOLIC BLOOD PRESSURE: 151 MMHG | DIASTOLIC BLOOD PRESSURE: 90 MMHG | RESPIRATION RATE: 16 BRPM | HEART RATE: 79 BPM | OXYGEN SATURATION: 100 % | BODY MASS INDEX: 35.99 KG/M2 | HEIGHT: 65 IN | TEMPERATURE: 98 F

## 2023-04-05 DIAGNOSIS — R52 BODY ACHES: Primary | ICD-10-CM

## 2023-04-05 DIAGNOSIS — R11.10 VOMITING, UNSPECIFIED VOMITING TYPE, UNSPECIFIED WHETHER NAUSEA PRESENT: ICD-10-CM

## 2023-04-05 PROCEDURE — 99203 OFFICE O/P NEW LOW 30 MIN: CPT | Mod: S$GLB,,, | Performed by: NURSE PRACTITIONER

## 2023-04-05 PROCEDURE — 99203 PR OFFICE/OUTPT VISIT, NEW, LEVL III, 30-44 MIN: ICD-10-PCS | Mod: S$GLB,,, | Performed by: NURSE PRACTITIONER

## 2023-04-05 RX ORDER — ONDANSETRON 4 MG/1
4 TABLET, ORALLY DISINTEGRATING ORAL EVERY 8 HOURS PRN
Qty: 20 TABLET | Refills: 0 | Status: SHIPPED | OUTPATIENT
Start: 2023-04-05

## 2023-04-05 NOTE — PROGRESS NOTES
"Subjective:      Patient ID: Steph Quigley is a 52 y.o. female.    Vitals:  height is 5' 5" (1.651 m) and weight is 98 kg (216 lb). Her temperature is 98.1 °F (36.7 °C). Her blood pressure is 151/90 (abnormal) and her pulse is 79. Her respiration is 16 and oxygen saturation is 100%.     Chief Complaint: Emesis    Emesis   This is a new problem. The current episode started today. The problem has been resolved. Associated symptoms include abdominal pain (Epigastric pain during the night prior to vomiting, resolved), headaches (Onset middle of the night, severe.  Now resolved) and myalgias (Generalized body aches). Pertinent negatives include no chills, coughing, diarrhea or fever.     Constitution: Positive for sweating (During a bowel movement, resolved) and generalized weakness. Negative for chills and fever.   HENT:  Negative for congestion and sore throat.    Respiratory:  Negative for cough.    Gastrointestinal:  Positive for abdominal pain (Epigastric pain during the night prior to vomiting, resolved) and vomiting. Negative for constipation and diarrhea.        Numerous BM's, formed stool.   Genitourinary:  Negative for dysuria, frequency and urgency.   Musculoskeletal:  Positive for muscle ache (Generalized body aches).   Neurological:  Positive for headaches (Onset middle of the night, severe.  Now resolved).    Objective:     Physical Exam   Constitutional: She is oriented to person, place, and time. She appears well-developed.  Non-toxic appearance. She does not appear ill. No distress.   HENT:   Head: Normocephalic and atraumatic.   Ears:   Right Ear: Tympanic membrane, external ear and ear canal normal.   Left Ear: Tympanic membrane, external ear and ear canal normal.   Nose: Nose normal.   Mouth/Throat: Oropharynx is clear and moist. Mucous membranes are moist.   Eyes: Conjunctivae and EOM are normal.   Cardiovascular: Normal rate, regular rhythm and normal heart sounds.   Pulmonary/Chest: Effort normal " and breath sounds normal. No respiratory distress. She has no wheezes. She has no rhonchi. She has no rales.   Abdominal: Normal appearance. Soft. flat abdomen There is no abdominal tenderness. There is no rebound and no guarding.   Neurological: no focal deficit. She is alert and oriented to person, place, and time.   Skin: Skin is warm and dry. Capillary refill takes 2 to 3 seconds.   Psychiatric: Her behavior is normal. Mood normal.   Nursing note and vitals reviewed.    Assessment:     1. Body aches    2. Vomiting, unspecified vomiting type, unspecified whether nausea present        Plan:       Body aches    Vomiting, unspecified vomiting type, unspecified whether nausea present    Other orders  -     ondansetron (ZOFRAN-ODT) 4 MG TbDL; Take 1 tablet (4 mg total) by mouth every 8 (eight) hours as needed (nausea).  Dispense: 20 tablet; Refill: 0

## 2023-04-05 NOTE — LETTER
April 5, 2023      [facility]  [address]  [facility] [address]  Phone: [phone]       Patient: Steph Quigley   YOB: 1970  Date of Visit: 04/05/2023    To Whom It May Concern:    Jeffy Quigley  was at [facility] on 04/05/2023. The patient may return to work/school on 04/06/2023  with no restrictions. If you have any questions or concerns, or if I can be of further assistance, please do not hesitate to contact me.    Sincerely,    Fernanda Manzano, NP

## 2023-04-05 NOTE — PATIENT INSTRUCTIONS
Zofran as directed as needed for nausea/vomiting.    Clear liquids for 24 hours after last episode of vomiting.  Advance diet as tolerated starting off with bland, easy to digest, soft foods.  It is recommended to refrain from dairy from at least 72 hours.

## 2023-05-09 ENCOUNTER — PATIENT MESSAGE (OUTPATIENT)
Dept: RESEARCH | Facility: HOSPITAL | Age: 53
End: 2023-05-09
Payer: COMMERCIAL

## 2023-11-02 DIAGNOSIS — R05.9 COUGH: Primary | ICD-10-CM

## 2023-11-22 ENCOUNTER — HOSPITAL ENCOUNTER (OUTPATIENT)
Dept: PREADMISSION TESTING | Facility: OTHER | Age: 53
Discharge: HOME | End: 2023-11-22
Attending: ORTHOPAEDIC SURGERY
Payer: COMMERCIAL

## 2023-11-22 ENCOUNTER — ANESTHESIA EVENT (OUTPATIENT)
Dept: SURGERY | Facility: OTHER | Age: 53
End: 2023-11-22
Payer: COMMERCIAL

## 2023-11-22 VITALS
TEMPERATURE: 98 F | RESPIRATION RATE: 20 BRPM | DIASTOLIC BLOOD PRESSURE: 76 MMHG | HEIGHT: 65 IN | SYSTOLIC BLOOD PRESSURE: 138 MMHG | WEIGHT: 232 LBS | HEART RATE: 72 BPM | OXYGEN SATURATION: 100 % | BODY MASS INDEX: 38.65 KG/M2

## 2023-11-22 DIAGNOSIS — Z01.818 PREOP TESTING: Primary | ICD-10-CM

## 2023-11-22 LAB
ABO + RH BLD: NORMAL
ALBUMIN SERPL BCP-MCNC: 3.8 G/DL (ref 3.5–5.2)
ALP SERPL-CCNC: 70 U/L (ref 55–135)
ALT SERPL W/O P-5'-P-CCNC: 17 U/L (ref 10–44)
ANION GAP SERPL CALC-SCNC: 8 MMOL/L (ref 8–16)
AST SERPL-CCNC: 13 U/L (ref 10–40)
BASOPHILS # BLD AUTO: 0.03 K/UL (ref 0–0.2)
BASOPHILS NFR BLD: 0.4 % (ref 0–1.9)
BILIRUB SERPL-MCNC: 0.3 MG/DL (ref 0.1–1)
BILIRUB UR QL STRIP: NEGATIVE
BLD GP AB SCN CELLS X3 SERPL QL: NORMAL
BUN SERPL-MCNC: 15 MG/DL (ref 6–20)
CALCIUM SERPL-MCNC: 9.5 MG/DL (ref 8.7–10.5)
CHLORIDE SERPL-SCNC: 108 MMOL/L (ref 95–110)
CLARITY UR: CLEAR
CO2 SERPL-SCNC: 22 MMOL/L (ref 23–29)
COLOR UR: YELLOW
CREAT SERPL-MCNC: 0.9 MG/DL (ref 0.5–1.4)
DIFFERENTIAL METHOD: ABNORMAL
EOSINOPHIL # BLD AUTO: 0.1 K/UL (ref 0–0.5)
EOSINOPHIL NFR BLD: 1.6 % (ref 0–8)
ERYTHROCYTE [DISTWIDTH] IN BLOOD BY AUTOMATED COUNT: 14 % (ref 11.5–14.5)
EST. GFR  (NO RACE VARIABLE): >60 ML/MIN/1.73 M^2
GLUCOSE SERPL-MCNC: 104 MG/DL (ref 70–110)
GLUCOSE UR QL STRIP: NEGATIVE
HCT VFR BLD AUTO: 40.9 % (ref 37–48.5)
HGB BLD-MCNC: 13.2 G/DL (ref 12–16)
HGB UR QL STRIP: ABNORMAL
IMM GRANULOCYTES # BLD AUTO: 0.02 K/UL (ref 0–0.04)
IMM GRANULOCYTES NFR BLD AUTO: 0.3 % (ref 0–0.5)
KETONES UR QL STRIP: NEGATIVE
LEUKOCYTE ESTERASE UR QL STRIP: NEGATIVE
LYMPHOCYTES # BLD AUTO: 2.4 K/UL (ref 1–4.8)
LYMPHOCYTES NFR BLD: 30.8 % (ref 18–48)
MCH RBC QN AUTO: 27.7 PG (ref 27–31)
MCHC RBC AUTO-ENTMCNC: 32.3 G/DL (ref 32–36)
MCV RBC AUTO: 86 FL (ref 82–98)
MONOCYTES # BLD AUTO: 0.6 K/UL (ref 0.3–1)
MONOCYTES NFR BLD: 8 % (ref 4–15)
NEUTROPHILS # BLD AUTO: 4.7 K/UL (ref 1.8–7.7)
NEUTROPHILS NFR BLD: 58.9 % (ref 38–73)
NITRITE UR QL STRIP: NEGATIVE
NRBC BLD-RTO: 0 /100 WBC
PH UR STRIP: 6 [PH] (ref 5–8)
PLATELET # BLD AUTO: 231 K/UL (ref 150–450)
PMV BLD AUTO: 8.7 FL (ref 9.2–12.9)
POTASSIUM SERPL-SCNC: 4.5 MMOL/L (ref 3.5–5.1)
PROT SERPL-MCNC: 7.1 G/DL (ref 6–8.4)
PROT UR QL STRIP: NEGATIVE
RBC # BLD AUTO: 4.76 M/UL (ref 4–5.4)
SODIUM SERPL-SCNC: 138 MMOL/L (ref 136–145)
SP GR UR STRIP: 1.02 (ref 1–1.03)
SPECIMEN OUTDATE: NORMAL
URN SPEC COLLECT METH UR: ABNORMAL
UROBILINOGEN UR STRIP-ACNC: NEGATIVE EU/DL
WBC # BLD AUTO: 7.91 K/UL (ref 3.9–12.7)

## 2023-11-22 PROCEDURE — 80053 COMPREHEN METABOLIC PANEL: CPT | Performed by: ORTHOPAEDIC SURGERY

## 2023-11-22 PROCEDURE — 86901 BLOOD TYPING SEROLOGIC RH(D): CPT | Performed by: ORTHOPAEDIC SURGERY

## 2023-11-22 PROCEDURE — 85025 COMPLETE CBC W/AUTO DIFF WBC: CPT | Performed by: ORTHOPAEDIC SURGERY

## 2023-11-22 PROCEDURE — 36415 COLL VENOUS BLD VENIPUNCTURE: CPT | Performed by: ORTHOPAEDIC SURGERY

## 2023-11-22 PROCEDURE — 81003 URINALYSIS AUTO W/O SCOPE: CPT | Performed by: ORTHOPAEDIC SURGERY

## 2023-11-22 RX ORDER — ACETAMINOPHEN 500 MG
1000 TABLET ORAL
Status: CANCELLED | OUTPATIENT
Start: 2023-11-22 | End: 2023-11-22

## 2023-11-22 RX ORDER — ALPRAZOLAM 2 MG/1
TABLET ORAL
COMMUNITY
Start: 2023-10-31

## 2023-11-22 RX ORDER — BUPROPION HYDROCHLORIDE 300 MG/1
300 TABLET ORAL NIGHTLY
COMMUNITY
Start: 2023-11-16

## 2023-11-22 RX ORDER — ERGOCALCIFEROL 1.25 MG/1
50000 CAPSULE ORAL
COMMUNITY
Start: 2023-11-16

## 2023-11-22 RX ORDER — PREGABALIN 75 MG/1
75 CAPSULE ORAL ONCE
Status: CANCELLED | OUTPATIENT
Start: 2023-11-22 | End: 2023-11-22

## 2023-11-22 RX ORDER — SODIUM CHLORIDE, SODIUM LACTATE, POTASSIUM CHLORIDE, CALCIUM CHLORIDE 600; 310; 30; 20 MG/100ML; MG/100ML; MG/100ML; MG/100ML
INJECTION, SOLUTION INTRAVENOUS CONTINUOUS
Status: CANCELLED | OUTPATIENT
Start: 2023-11-22

## 2023-11-22 RX ORDER — DESVENLAFAXINE SUCCINATE 50 MG/1
50 TABLET, EXTENDED RELEASE ORAL NIGHTLY
COMMUNITY
Start: 2023-11-06

## 2023-11-22 RX ORDER — LIDOCAINE HYDROCHLORIDE 10 MG/ML
0.5 INJECTION, SOLUTION EPIDURAL; INFILTRATION; INTRACAUDAL; PERINEURAL ONCE
Status: CANCELLED | OUTPATIENT
Start: 2023-11-22 | End: 2023-11-22

## 2023-11-22 NOTE — ANESTHESIA PREPROCEDURE EVALUATION
11/22/2023  Steph Quigley is a 53 y.o., female.      Pre-op Assessment    I have reviewed the Patient Summary Reports.     I have reviewed the Nursing Notes. I have reviewed the NPO Status.   I have reviewed the Medications.     Review of Systems  Anesthesia Hx:  No problems with previous Anesthesia             Denies Family Hx of Anesthesia complications.    Denies Personal Hx of Anesthesia complications.                    Social:  Non-Smoker       Hematology/Oncology:  Hematology Normal   Oncology Normal                                   EENT/Dental:  EENT/Dental Normal           Cardiovascular:  Cardiovascular Normal                                            Pulmonary:  Pulmonary Normal                       Renal/:  Renal/ Normal                 Hepatic/GI:  Hepatic/GI Normal                 Musculoskeletal:  Musculoskeletal Normal                Neurological:  Neurology Normal                                      Endocrine:  Endocrine Normal          Morbid Obesity / BMI > 40  Dermatological:  Skin Normal    Psych:    depression                Physical Exam  General: Well nourished and Alert    Airway:  Mallampati: II   Mouth Opening: Normal  Tongue: Normal    Dental:  Intact        Anesthesia Plan  Type of Anesthesia, risks & benefits discussed:    Anesthesia Type: Spinal  Intra-op Monitoring Plan: Standard ASA Monitors  Post Op Pain Control Plan: multimodal analgesia  Induction:  IV  Informed Consent: Informed consent signed with the Patient and all parties understand the risks and agree with anesthesia plan.  All questions answered.   ASA Score: 3  Anesthesia Plan Notes: SAB discussed  Labs per surgeon    Ready For Surgery From Anesthesia Perspective.     .

## 2023-11-22 NOTE — DISCHARGE INSTRUCTIONS
Information to Prepare you for your Surgery    PRE-ADMIT TESTING -  [phone]    [address] [facility]          Your surgery has been scheduled at [facility]. We are pleased to have the opportunity to serve you. For Further Information please call [phone].    On the day of surgery please report to the Information Desk on the 1st floor.    CONTACT YOUR PHYSICIAN'S OFFICE THE DAY PRIOR TO YOUR SURGERY TO OBTAIN YOUR ARRIVAL TIME.     The evening before surgery do not eat anything after 9 p.m. ( this includes hard candy, chewing gum and mints).  You may only have GATORADE, POWERADE AND WATER  from 9 p.m. until you leave your home.   DO NOT DRINK ANY LIQUIDS ON THE WAY TO THE HOSPITAL.      Why does your anesthesiologist allow you to drink Gatorade/Powerade before surgery?  Gatorade/Powerade helps to increase your comfort before surgery and to decrease your nausea after surgery. The carbohydrates in Gatorade/Powerade help reduce your body's stress response to surgery.  If you are a diabetic-drink only water prior to surgery.    Outpatient Surgery- May allow 2 adult (18 and older) Support Persons (1 being the designated ) for all surgical/procedural patients. A breastfeeding mother will be allowed her infant and 2 adult Support Persons. No one under the age of 18 will be allowed in the building.      SPECIAL MEDICATION INSTRUCTIONS: TAKE medications checked off by the Anesthesiologist on your Medication List.    Angiogram Patients: Take medications as instructed by your physician, including aspirin.     Surgery Patients:    If you take ASPIRIN - Your PHYSICIAN/SURGEON will need to inform you IF/OR when you need to stop taking aspirin prior to your surgery.     The week prior to surgery do not ot take any medications containing IBUPROFEN or NSAIDS ( Advil, Motrin, Goodys, BC, Aleve, Naproxen etc) If you are not sure if you should take a medicine  please call your surgeon's office.  Ok to take Tylenol    Do Not Wear any make-up (especially eye make-up) to surgery. Please remove any false eyelashes or eyelash extensions. If you arrive the day of surgery with makeup/eyelashes on you will be required to remove prior to surgery. (There is a risk of corneal abrasions if eye makeup/eyelash extensions are not removed)      Leave all valuables at home.   Do Not wear any jewelry or watches, including any metal in body piercings. Jewelry must be removed prior to coming to the hospital.  There is a possibility that rings that are unable to be removed may be cut off if they are on the surgical extremity.    Please remove all hair extensions, wigs, clips and any other metal accessories/ ornaments from your hair.  These items may pose a flammable/fire risk in Surgery and must be removed.    Do not shave your surgical area at least 5 days prior to your surgery. The surgical prep will be performed at the hospital according to Infection Control regulations.    Contact Lens must be removed before surgery. Either do not wear the contact lens or bring a case and solution for storage.  Please bring a container for eyeglasses or dentures as required.  Bring any paperwork your physician has provided, such as consent forms,  history and physicals, doctor's orders, etc.   Bring comfortable clothes that are loose fitting to wear upon discharge. Take into consideration the type of surgery being performed.  Maintain your diet as advised per your physician the day prior to surgery.      Adequate rest the night before surgery is advised.   Park in the Parking lot behind the hospital or in the [facility] across the street from the parking lot. Parking is complimentary.  If you will be discharged the same day as your procedure, please arrange for a responsible adult to drive you home or to accompany you if traveling by taxi.   YOU WILL NOT BE PERMITTED TO DRIVE OR TO LEAVE THE  HOSPITAL ALONE AFTER SURGERY.   If you are being discharged the same day, it is strongly recommended that you arrange for someone to remain with you for the first 24 hrs following your surgery.    The Surgeon will speak to your family/visitor after your surgery regarding the outcome of your surgery and post op care.  The Surgeon may speak to you after your surgery, but there is a possibility you may not remember the details.  Please check with your family members regarding the conversation with the Surgeon.    We strongly recommend whoever is bringing you home be present for discharge instructions.  This will ensure a thorough understanding for your post op home care.          Thank you for your cooperation.  The Staff of [facility].            Bathing Instructions with Hibiclens    Shower the evening before and morning of your procedure with Chlorhexidine (Hibiclens)  do not use Chlorhexidine on your face or genitals. Do not get in your eyes.  Wash your face with water and your regular face wash/soap  Use your regular shampoo  Apply Chlorhexidine (Hibiclens) directly on your skin or on a wet washcloth and wash gently. When showering: Move away from the shower stream when applying Chlorhexidine (Hibiclens) to avoid rinsing off too soon.  Rinse thoroughly with warm water  Do not dilute Chlorhexidine (Hibiclens)   Dry off as usual, do not use any deodorant, powder, body lotions, perfume, after shave or cologne.

## 2023-11-27 RX ORDER — ACETAMINOPHEN 500 MG
1000 TABLET ORAL
Status: CANCELLED | OUTPATIENT
Start: 2023-12-04 | End: 2023-12-04

## 2023-11-27 NOTE — DISCHARGE INSTRUCTIONS
Knee Athroplasty Discharge Instructions    Pain:   After surgery your knee will be sore. The knee will likely have been injected with a numbing medicine (Exparel) prior to completion of surgery for pain control. This is indicated on a green bracelet that you will continue to wear for 4 days after surgery. Ice and elevation will assist with pain control.    Apply ice as much as possible for the first 72 hours. After 72 hours, apply ice for 20minutes after therapy or whenever experiencing pain. Avoid direct skin contact with ice to prevent frostbit.           Elevate the affected leg with the pillow the length of the leg higher than your heart to assist with swelling and pain.  Incision Care:   Some drainage from the incision in the first 72 hours is normal. If drainage is excessive, reinforce dressing and call home health nurse or therapist. Keep incision clean, dry and covered until staples removed. Staples will be removed 14-21 days after surgery .    Activity:                  Perform exercises 2 x day.   You may shower 48 hours after surgery  providing the dressing is waterproof. You can wrap the knee or hip with press n seal saran wrap to assist with keeping the dressing dry while showering. Dr. Hamm prefers his patients not to shower for 2 weeks and to sponge bathe.No tub or hot tub usage for 6 weeks after surgery. Support help is mandatory during showering. If the  dressing becomes wet, replace with a new dressing. Do not leave a wet dressing on.   Wear anny hose to both extremities  for 3 weeks after surgery .You may remove stockings for 1- 2 hours during the day only.            If your physician orders the CPM machine you are to use it for 2 hours in the am and 2 hours in the pm. This is not to replace your exercise program.    You may need antibiotic coverage before dental or minor surgical procedures.  Possible Complication:  Infections: Report these signs and symptoms to your surgeon:  Unexpected  redness around incision  Persistent drainage from wound after 72 hours  Temperature Greater than 101 degrees F  Additional swelling  Pain not controlled with current pain medication  Blood Clot: Report these signs and symptoms to your surgeon:  Unusual pain, unusual warm skin  Red or discolored skin  Swelling in the leg             Your Surgeon Gave You EXPAREL® (bupivacaine liposome injectable suspension)    To help control your pain after surgery, your surgeon injected EXPAREL into your surgical incision just before the end of the procedure.   EXPAREL is a local analgesic that contains the local anesthetic bupivacaine. Local anesthetics provide pain relief by numbing the tissue  around the surgical site.   EXPAREL is specifically designed to release pain medication over time and can control pain for up to 72 hours.   In addition to EXPAREL, your surgeon may provide other pain medications to control your pain.   Each patient is different and responds differently to painmedication. Depending on how you respond to EXPAREL, you may require less  additional pain medication during your recovery.    Possible Side Effects    Side effects can occur with any medication and it is important not to ignore anything you may be experiencing. Some patients who  received EXPAREL experienced nausea, vomiting, or constipation. Rarely, patients who receive bupivacaine (the active ingredient in  EXPAREL) have experienced numbness and tingling in their mouth or lips, lightheadedness, or anxiety. Speak with your doctor right  away if you think you may be experiencing any of these sensations, or if you have other questions regarding possible side effects.    Your Recovery    When your pain is under control, your body can better focus on healing. This is not the time to test your pain tolerance, or grin and  bear it.Work with your surgeon and nurse to make your recovery as speedy and pain-free as possible.   Follow the post-op orders your  nurse gave you.   Eat a healthy diet and drink plenty of water. Surgery stresses your body; your body responds by needing more energy to heal.      Important Information About EXPAREL  Products that contain bupivacaine, like EXPAREL, may cause a temporary loss of  sensation or the ability to move in the area where bupivacaine was injected.    Date Administered: 12/4/2023  Time Administered: 7:20 am    Other Forms of Bupivicaine should not be administered within 96hrs following administration of EXPAREL.     [facility] Care Cube Instructions

## 2023-11-30 ENCOUNTER — HOSPITAL ENCOUNTER (OUTPATIENT)
Dept: RADIOLOGY | Facility: HOSPITAL | Age: 53
Discharge: HOME | End: 2023-11-30
Attending: NURSE PRACTITIONER
Payer: COMMERCIAL

## 2023-11-30 DIAGNOSIS — R05.9 COUGH: ICD-10-CM

## 2023-11-30 PROCEDURE — 71046 X-RAY EXAM CHEST 2 VIEWS: CPT | Mod: TC,PO

## 2023-12-01 NOTE — PLAN OF CARE
LMSW contacted patient. Patient denies the use of HH. Patient has a RW and BSC. Patient is agreeable to MD's preferred choice for HH. Patient choice pharmacy is bedside. Patient's PCP is correct. Patient's family will transport the patient home at discharge.     [facility] ([facility])  Initial Discharge Assessment       Primary Care Provider: Concepcion Chris NP    Admission Diagnosis: Osteoarthritis of right knee [M17.11]  Unilateral osteoarthritis of hip, right [M16.11]    Admission Date: (Not on file)  Expected Discharge Date:     Transition of Care Barriers: (P) None    Payor: BLUE CROSS BLUE [employer] / Plan: BLUE CONNECT / Product Type: HMO /     Extended Emergency Contact Information  Primary Emergency Contact: brielle evangelista  Home Phone: [phone]  Mobile Phone: [phone]  Relation: Sister  Secondary Emergency Contact: Jose Epstein   [facility]  Home Phone: [phone]  Relation: Son    Discharge Plan A: (P) [id number] [address] & [address] [address]  Phone: [phone] Fax: [phone]      Initial Assessment (most recent)       Adult Discharge Assessment - 12/01/23 1528          Discharge Assessment    Assessment Type Discharge Planning Assessment     Confirmed/corrected address, phone number and insurance Yes     Confirmed Demographics Correct on Facesheet     Source of Information patient     People in Home sibling(s);child(jozef), adult     Do you expect to return to your current living situation? Yes     Do you have help at home or someone to help you manage your care at home? Yes     Prior to hospitilization cognitive status: Alert/Oriented;No Deficits     Current cognitive status: Alert/Oriented;No Deficits     Equipment Currently Used at Home walker, rolling;bedside commode     Readmission within 30 days? No     Patient currently being followed by outpatient case  management? No     Do you currently have service(s) that help you manage your care at home? No     Do you take prescription medications? Yes (P)      Do you have prescription coverage? Yes (P)      Do you have any problems affording any of your prescribed medications? No (P)      Is the patient taking medications as prescribed? yes (P)      How do you get to doctors appointments? family or friend will provide;car, drives self (P)      Are you on dialysis? No (P)      Do you take coumadin? No (P)      DME Needed Upon Discharge  none     Discharge Plan discussed with: Patient (P)      Transition of Care Barriers None (P)      Discharge Plan A Home Health (P)

## 2023-12-04 ENCOUNTER — ANESTHESIA (OUTPATIENT)
Dept: SURGERY | Facility: OTHER | Age: 53
End: 2023-12-04
Payer: COMMERCIAL

## 2023-12-04 ENCOUNTER — HOSPITAL ENCOUNTER (OUTPATIENT)
Facility: OTHER | Age: 53
Discharge: HOME HEALTH | End: 2023-12-04
Attending: ORTHOPAEDIC SURGERY | Admitting: ORTHOPAEDIC SURGERY
Payer: COMMERCIAL

## 2023-12-04 VITALS
TEMPERATURE: 98 F | HEART RATE: 75 BPM | BODY MASS INDEX: 38.65 KG/M2 | WEIGHT: 232 LBS | OXYGEN SATURATION: 100 % | DIASTOLIC BLOOD PRESSURE: 74 MMHG | HEIGHT: 65 IN | RESPIRATION RATE: 18 BRPM | SYSTOLIC BLOOD PRESSURE: 130 MMHG

## 2023-12-04 DIAGNOSIS — M17.11 OSTEOARTHRITIS OF RIGHT KNEE: ICD-10-CM

## 2023-12-04 DIAGNOSIS — M17.11 OSTEOARTHRITIS OF RIGHT KNEE, UNSPECIFIED OSTEOARTHRITIS TYPE: Primary | ICD-10-CM

## 2023-12-04 PROCEDURE — 71000033 HC RECOVERY, INTIAL HOUR: Performed by: ORTHOPAEDIC SURGERY

## 2023-12-04 PROCEDURE — D9220A PRA ANESTHESIA: ICD-10-PCS | Mod: ANES,,, | Performed by: ANESTHESIOLOGY

## 2023-12-04 PROCEDURE — D9220A PRA ANESTHESIA: ICD-10-PCS | Mod: CRNA,,, | Performed by: NURSE ANESTHETIST, CERTIFIED REGISTERED

## 2023-12-04 PROCEDURE — 25000003 PHARM REV CODE 250: Performed by: ANESTHESIOLOGY

## 2023-12-04 PROCEDURE — 27201423 OPTIME MED/SURG SUP & DEVICES STERILE SUPPLY: Performed by: ORTHOPAEDIC SURGERY

## 2023-12-04 PROCEDURE — 71000039 HC RECOVERY, EACH ADD'L HOUR: Performed by: ORTHOPAEDIC SURGERY

## 2023-12-04 PROCEDURE — 36000711: Performed by: ORTHOPAEDIC SURGERY

## 2023-12-04 PROCEDURE — 71000015 HC POSTOP RECOV 1ST HR: Performed by: ORTHOPAEDIC SURGERY

## 2023-12-04 PROCEDURE — 63600175 PHARM REV CODE 636 W HCPCS: Performed by: ORTHOPAEDIC SURGERY

## 2023-12-04 PROCEDURE — 37000008 HC ANESTHESIA 1ST 15 MINUTES: Performed by: ORTHOPAEDIC SURGERY

## 2023-12-04 PROCEDURE — 63600175 PHARM REV CODE 636 W HCPCS: Performed by: ANESTHESIOLOGY

## 2023-12-04 PROCEDURE — C1776 JOINT DEVICE (IMPLANTABLE): HCPCS | Performed by: ORTHOPAEDIC SURGERY

## 2023-12-04 PROCEDURE — C1713 ANCHOR/SCREW BN/BN,TIS/BN: HCPCS | Performed by: ORTHOPAEDIC SURGERY

## 2023-12-04 PROCEDURE — 63600175 PHARM REV CODE 636 W HCPCS: Performed by: NURSE ANESTHETIST, CERTIFIED REGISTERED

## 2023-12-04 PROCEDURE — 25000003 PHARM REV CODE 250: Performed by: NURSE ANESTHETIST, CERTIFIED REGISTERED

## 2023-12-04 PROCEDURE — 71000016 HC POSTOP RECOV ADDL HR: Performed by: ORTHOPAEDIC SURGERY

## 2023-12-04 PROCEDURE — 25000003 PHARM REV CODE 250: Performed by: ORTHOPAEDIC SURGERY

## 2023-12-04 PROCEDURE — C9290 INJ, BUPIVACAINE LIPOSOME: HCPCS | Performed by: ORTHOPAEDIC SURGERY

## 2023-12-04 PROCEDURE — D9220A PRA ANESTHESIA: Mod: CRNA,,, | Performed by: NURSE ANESTHETIST, CERTIFIED REGISTERED

## 2023-12-04 PROCEDURE — 37000009 HC ANESTHESIA EA ADD 15 MINS: Performed by: ORTHOPAEDIC SURGERY

## 2023-12-04 PROCEDURE — D9220A PRA ANESTHESIA: Mod: ANES,,, | Performed by: ANESTHESIOLOGY

## 2023-12-04 PROCEDURE — 97110 THERAPEUTIC EXERCISES: CPT

## 2023-12-04 PROCEDURE — 36000710: Performed by: ORTHOPAEDIC SURGERY

## 2023-12-04 PROCEDURE — 97161 PT EVAL LOW COMPLEX 20 MIN: CPT

## 2023-12-04 PROCEDURE — 97116 GAIT TRAINING THERAPY: CPT

## 2023-12-04 DEVICE — CEMENT REFOBACIN BCR 1X40: Type: IMPLANTABLE DEVICE | Site: KNEE | Status: FUNCTIONAL

## 2023-12-04 DEVICE — IMPLANTABLE DEVICE: Type: IMPLANTABLE DEVICE | Site: KNEE | Status: FUNCTIONAL

## 2023-12-04 RX ORDER — MIDAZOLAM HYDROCHLORIDE 1 MG/ML
INJECTION INTRAMUSCULAR; INTRAVENOUS
Status: DISCONTINUED | OUTPATIENT
Start: 2023-12-04 | End: 2023-12-04

## 2023-12-04 RX ORDER — MEPERIDINE HYDROCHLORIDE 25 MG/ML
12.5 INJECTION INTRAMUSCULAR; INTRAVENOUS; SUBCUTANEOUS ONCE AS NEEDED
Status: DISCONTINUED | OUTPATIENT
Start: 2023-12-04 | End: 2023-12-04

## 2023-12-04 RX ORDER — HYDROMORPHONE HYDROCHLORIDE 2 MG/ML
0.5 INJECTION, SOLUTION INTRAMUSCULAR; INTRAVENOUS; SUBCUTANEOUS EVERY 6 HOURS PRN
Status: COMPLETED | OUTPATIENT
Start: 2023-12-04 | End: 2023-12-04

## 2023-12-04 RX ORDER — OXYCODONE AND ACETAMINOPHEN 5; 325 MG/1; MG/1
TABLET ORAL
Qty: 60 TABLET | Refills: 0 | Status: SHIPPED | OUTPATIENT
Start: 2023-12-04

## 2023-12-04 RX ORDER — NAPROXEN SODIUM 220 MG/1
81 TABLET, FILM COATED ORAL 2 TIMES DAILY
Qty: 60 TABLET | Refills: 0 | Status: SHIPPED | OUTPATIENT
Start: 2023-12-04

## 2023-12-04 RX ORDER — HYDROMORPHONE HYDROCHLORIDE 2 MG/ML
0.4 INJECTION, SOLUTION INTRAMUSCULAR; INTRAVENOUS; SUBCUTANEOUS EVERY 5 MIN PRN
Status: DISCONTINUED | OUTPATIENT
Start: 2023-12-04 | End: 2023-12-04

## 2023-12-04 RX ORDER — DIPHENHYDRAMINE HYDROCHLORIDE 50 MG/ML
INJECTION INTRAMUSCULAR; INTRAVENOUS
Status: DISCONTINUED | OUTPATIENT
Start: 2023-12-04 | End: 2023-12-04

## 2023-12-04 RX ORDER — SODIUM CHLORIDE, SODIUM LACTATE, POTASSIUM CHLORIDE, CALCIUM CHLORIDE 600; 310; 30; 20 MG/100ML; MG/100ML; MG/100ML; MG/100ML
INJECTION, SOLUTION INTRAVENOUS CONTINUOUS
Status: DISCONTINUED | OUTPATIENT
Start: 2023-12-04 | End: 2023-12-04 | Stop reason: HOSPADM

## 2023-12-04 RX ORDER — LIDOCAINE HYDROCHLORIDE 10 MG/ML
0.5 INJECTION, SOLUTION EPIDURAL; INFILTRATION; INTRACAUDAL; PERINEURAL ONCE
Status: DISCONTINUED | OUTPATIENT
Start: 2023-12-04 | End: 2023-12-04 | Stop reason: HOSPADM

## 2023-12-04 RX ORDER — OXYCODONE HYDROCHLORIDE 5 MG/1
5 TABLET ORAL EVERY 4 HOURS PRN
Status: DISCONTINUED | OUTPATIENT
Start: 2023-12-04 | End: 2023-12-04 | Stop reason: HOSPADM

## 2023-12-04 RX ORDER — PREGABALIN 75 MG/1
75 CAPSULE ORAL ONCE
Status: COMPLETED | OUTPATIENT
Start: 2023-12-04 | End: 2023-12-04

## 2023-12-04 RX ORDER — OXYCODONE HYDROCHLORIDE 5 MG/1
5 TABLET ORAL
Status: DISCONTINUED | OUTPATIENT
Start: 2023-12-04 | End: 2023-12-04 | Stop reason: HOSPADM

## 2023-12-04 RX ORDER — ROPIVACAINE HYDROCHLORIDE 5 MG/ML
INJECTION, SOLUTION EPIDURAL; INFILTRATION; PERINEURAL
Status: COMPLETED | OUTPATIENT
Start: 2023-12-04 | End: 2023-12-04

## 2023-12-04 RX ORDER — METOCLOPRAMIDE HYDROCHLORIDE 5 MG/ML
5 INJECTION INTRAMUSCULAR; INTRAVENOUS EVERY 6 HOURS PRN
Status: DISCONTINUED | OUTPATIENT
Start: 2023-12-04 | End: 2023-12-04 | Stop reason: HOSPADM

## 2023-12-04 RX ORDER — PROPOFOL 10 MG/ML
VIAL (ML) INTRAVENOUS CONTINUOUS PRN
Status: DISCONTINUED | OUTPATIENT
Start: 2023-12-04 | End: 2023-12-04

## 2023-12-04 RX ORDER — ACETAMINOPHEN 500 MG
1000 TABLET ORAL
Status: COMPLETED | OUTPATIENT
Start: 2023-12-04 | End: 2023-12-04

## 2023-12-04 RX ORDER — ONDANSETRON 8 MG/1
8 TABLET, ORALLY DISINTEGRATING ORAL EVERY 8 HOURS PRN
Status: DISCONTINUED | OUTPATIENT
Start: 2023-12-04 | End: 2023-12-04 | Stop reason: HOSPADM

## 2023-12-04 RX ORDER — TRANEXAMIC ACID 100 MG/ML
INJECTION, SOLUTION INTRAVENOUS
Status: DISCONTINUED | OUTPATIENT
Start: 2023-12-04 | End: 2023-12-04

## 2023-12-04 RX ORDER — ONDANSETRON 8 MG/1
8 TABLET, ORALLY DISINTEGRATING ORAL EVERY 8 HOURS PRN
Qty: 20 TABLET | Refills: 1 | Status: SHIPPED | OUTPATIENT
Start: 2023-12-04

## 2023-12-04 RX ORDER — CEFAZOLIN SODIUM 1 G/3ML
2 INJECTION, POWDER, FOR SOLUTION INTRAMUSCULAR; INTRAVENOUS
Status: COMPLETED | OUTPATIENT
Start: 2023-12-04 | End: 2023-12-04

## 2023-12-04 RX ORDER — PHENYLEPHRINE HYDROCHLORIDE 10 MG/ML
INJECTION INTRAVENOUS
Status: DISCONTINUED | OUTPATIENT
Start: 2023-12-04 | End: 2023-12-04

## 2023-12-04 RX ORDER — ACETAMINOPHEN 325 MG/1
650 TABLET ORAL EVERY 4 HOURS PRN
Status: DISCONTINUED | OUTPATIENT
Start: 2023-12-04 | End: 2023-12-04 | Stop reason: HOSPADM

## 2023-12-04 RX ORDER — FENTANYL CITRATE 50 UG/ML
INJECTION, SOLUTION INTRAMUSCULAR; INTRAVENOUS
Status: DISCONTINUED | OUTPATIENT
Start: 2023-12-04 | End: 2023-12-04

## 2023-12-04 RX ORDER — OXYCODONE HYDROCHLORIDE 5 MG/1
10 TABLET ORAL EVERY 4 HOURS PRN
Status: DISCONTINUED | OUTPATIENT
Start: 2023-12-04 | End: 2023-12-04 | Stop reason: HOSPADM

## 2023-12-04 RX ORDER — SODIUM CHLORIDE 0.9 % (FLUSH) 0.9 %
3 SYRINGE (ML) INJECTION
Status: DISCONTINUED | OUTPATIENT
Start: 2023-12-04 | End: 2023-12-04 | Stop reason: HOSPADM

## 2023-12-04 RX ORDER — DEXMEDETOMIDINE HYDROCHLORIDE 100 UG/ML
INJECTION, SOLUTION INTRAVENOUS
Status: DISCONTINUED | OUTPATIENT
Start: 2023-12-04 | End: 2023-12-04

## 2023-12-04 RX ORDER — PROCHLORPERAZINE EDISYLATE 5 MG/ML
5 INJECTION INTRAMUSCULAR; INTRAVENOUS EVERY 30 MIN PRN
Status: DISCONTINUED | OUTPATIENT
Start: 2023-12-04 | End: 2023-12-04 | Stop reason: HOSPADM

## 2023-12-04 RX ORDER — SODIUM CHLORIDE 0.9 % (FLUSH) 0.9 %
3 SYRINGE (ML) INJECTION EVERY 6 HOURS PRN
Status: DISCONTINUED | OUTPATIENT
Start: 2023-12-04 | End: 2023-12-04 | Stop reason: HOSPADM

## 2023-12-04 RX ORDER — ONDANSETRON 2 MG/ML
INJECTION INTRAMUSCULAR; INTRAVENOUS
Status: DISCONTINUED | OUTPATIENT
Start: 2023-12-04 | End: 2023-12-04

## 2023-12-04 RX ORDER — BUPIVACAINE HCL/EPINEPHRINE 0.25-.0005
VIAL (ML) INJECTION
Status: DISCONTINUED | OUTPATIENT
Start: 2023-12-04 | End: 2023-12-04 | Stop reason: HOSPADM

## 2023-12-04 RX ADMIN — FENTANYL CITRATE 50 MCG: 50 INJECTION, SOLUTION INTRAMUSCULAR; INTRAVENOUS at 06:12

## 2023-12-04 RX ADMIN — PHENYLEPHRINE HYDROCHLORIDE 200 MCG: 10 INJECTION INTRAVENOUS at 07:12

## 2023-12-04 RX ADMIN — CEFAZOLIN 2 G: 330 INJECTION, POWDER, FOR SOLUTION INTRAMUSCULAR; INTRAVENOUS at 07:12

## 2023-12-04 RX ADMIN — PHENYLEPHRINE HYDROCHLORIDE 100 MCG: 10 INJECTION INTRAVENOUS at 07:12

## 2023-12-04 RX ADMIN — TRANEXAMIC ACID 2000 MG: 100 INJECTION, SOLUTION INTRAVENOUS at 06:12

## 2023-12-04 RX ADMIN — ROPIVACAINE HYDROCHLORIDE 3 ML: 5 INJECTION, SOLUTION EPIDURAL; INFILTRATION; PERINEURAL at 06:12

## 2023-12-04 RX ADMIN — MIDAZOLAM HYDROCHLORIDE 2 MG: 1 INJECTION, SOLUTION INTRAMUSCULAR; INTRAVENOUS at 06:12

## 2023-12-04 RX ADMIN — SODIUM CHLORIDE, POTASSIUM CHLORIDE, SODIUM LACTATE AND CALCIUM CHLORIDE: 600; 310; 30; 20 INJECTION, SOLUTION INTRAVENOUS at 05:12

## 2023-12-04 RX ADMIN — METOCLOPRAMIDE 5 MG: 5 INJECTION, SOLUTION INTRAMUSCULAR; INTRAVENOUS at 10:12

## 2023-12-04 RX ADMIN — ONDANSETRON HYDROCHLORIDE 4 MG: 2 INJECTION INTRAMUSCULAR; INTRAVENOUS at 06:12

## 2023-12-04 RX ADMIN — HYDROMORPHONE HYDROCHLORIDE 0.5 MG: 2 INJECTION INTRAMUSCULAR; INTRAVENOUS; SUBCUTANEOUS at 11:12

## 2023-12-04 RX ADMIN — OXYCODONE HYDROCHLORIDE 10 MG: 5 TABLET ORAL at 09:12

## 2023-12-04 RX ADMIN — OXYCODONE HYDROCHLORIDE 5 MG: 5 TABLET ORAL at 08:12

## 2023-12-04 RX ADMIN — VANCOMYCIN HYDROCHLORIDE 1500 MG: 1.5 INJECTION, POWDER, LYOPHILIZED, FOR SOLUTION INTRAVENOUS at 05:12

## 2023-12-04 RX ADMIN — DEXMEDETOMIDINE HYDROCHLORIDE 8 MCG: 100 INJECTION, SOLUTION, CONCENTRATE INTRAVENOUS at 07:12

## 2023-12-04 RX ADMIN — SODIUM CHLORIDE, SODIUM LACTATE, POTASSIUM CHLORIDE, AND CALCIUM CHLORIDE: .6; .31; .03; .02 INJECTION, SOLUTION INTRAVENOUS at 06:12

## 2023-12-04 RX ADMIN — PROPOFOL 75 MCG/KG/MIN: 10 INJECTION, EMULSION INTRAVENOUS at 07:12

## 2023-12-04 RX ADMIN — PREGABALIN 75 MG: 75 CAPSULE ORAL at 05:12

## 2023-12-04 RX ADMIN — DIPHENHYDRAMINE HYDROCHLORIDE 12.5 MG: 50 INJECTION, SOLUTION INTRAMUSCULAR; INTRAVENOUS at 07:12

## 2023-12-04 RX ADMIN — ACETAMINOPHEN 1000 MG: 500 TABLET ORAL at 05:12

## 2023-12-04 RX ADMIN — CEFAZOLIN 2 G: 2 INJECTION, POWDER, FOR SOLUTION INTRAMUSCULAR; INTRAVENOUS at 12:12

## 2023-12-04 NOTE — PLAN OF CARE
[facility]  [address]  [place] [address]  [phone]               [facility] Discharge Orders    Home [place]         Expected Discharge Date: 12/4/23    Diagnoses:  Post-op R TKR replacement    Patient is homebound due to:   Pt requires home health services due to taxing effort to leave the home as a result of immobility from Post-op R TKR  replacement    Weight Bearing Status:   full weight bearing: FWB unless otherwise indicated.  Progress to cane as able.  Set up for outpatient PT as soon as able after staple removal once patient is MOD 1 with cane.    Physical Therapy:   3 times a week for 2 weeks (Call for further orders beyond 2 weeks)  - Ambulate with a rolling walker  - Progress to cane  - Instruct on ROM and strengthening of knee    Aide to provide assistance with personal care and  ADLs  2 times a week for 2 weeks    Wound Care:   Surgical dressing change:Starting on  post op day 2 then, 3 times per week and prn saturation.Change dressing while knee in flexed position utilizing island dressing or apply gauze and cover with tegaderm.  Teach patient to change daily if draining.  Pt may shower if surgical dressing is waterproof. Protect surgical dressing from getting wet by using press and seal saranwrap or garbage bag. Removal and replacement of dressing after shower only needed if incision is suspected  to have gotten wet during shower.  Otherwise change as previously described depending on dressing/drainage. No soaking in the tub or hot tub use for 6 weeks.    PT/SN to remove staples 14 days Post-op and apply skin prep and steri-strips.    Cold therapy/Ice: Encouraged at least 20 minutes 2-3 times daily or more if desired.  Incision must be kept waterproof while icing.  Wear TEDS Bilateral Thigh High Stockings for 3 weeks. Anny hose x 3 weeks. Ok to remove anny hose 1-2 hours/day max if desired.   If CPM ordered Utilize 2 hours in morning and 2 hours in evening.  , Start at -5 degrees extension and 50 degree flexion. Increase flexion 10 degrees daily. Low post op mobility.       Contact:  Please contact Dr Zoran Hardwick [phone] with concerns.        BLOOD THINNER:    If sent home on Xarelto         -14 days post-op for TKR       -30 days post-op for THR     If sent home home on ASA    81mg   BID x 4 weeks     Once finished with prescribed blood thinner, patients can return to pre-surgical ASA dosage if they took ASA before surgery.       Outpatient Therapy: [facility]    [address] [address]   or  [address] [place] [address]    Call [phone] to schedule appointment  Fax [phone]    If need orders: Call Milly at Ext [phone]        DME:  - rolling Walker  - 3 in 1 commode.   - tub bench / shower chair  - Per PT/OT recommendation          Zoran Hardwick

## 2023-12-04 NOTE — PLAN OF CARE
Steph Quigley has met all discharge criteria from Phase II. Vital Signs are stable, ambulating  without difficulty. Discharge instructions given, patient verbalized understanding. Discharged from facility via wheelchair in stable condition.

## 2023-12-04 NOTE — ANESTHESIA POSTPROCEDURE EVALUATION
Anesthesia Post Evaluation    Patient: Steph Quigley    Procedure(s) Performed: Procedure(s) (LRB):  ARTHROPLASTY, KNEE, TOTAL (Right)    Final Anesthesia Type: spinal      Patient location during evaluation: PACU  Patient participation: Yes- Able to Participate  Level of consciousness: awake and alert  Post-procedure vital signs: reviewed and stable  Pain management: adequate  Airway patency: patent    PONV status at discharge: No PONV  Anesthetic complications: no      Cardiovascular status: blood pressure returned to baseline  Respiratory status: unassisted  Hydration status: euvolemic  Follow-up not needed.              Vitals Value Taken Time   /69 12/04/23 0906   Temp 36.7 °C (98 °F) 12/04/23 0906   Pulse 64 12/04/23 0913   Resp 16 12/04/23 0827   SpO2 97 % 12/04/23 0913   Vitals shown include unvalidated device data.      Event Time   Out of Recovery 09:14:51         Pain/Marlen Score: Pain Rating Prior to Med Admin: 0 (12/4/2023  8:27 AM)  Marlen Score: 9 (12/4/2023  8:48 AM)

## 2023-12-04 NOTE — OR NURSING
Pt with increased movement and sensation to BLE No change from previous assessment. Prepared for transfer to acu.

## 2023-12-04 NOTE — INTERVAL H&P NOTE
The patient has been examined and the H&P has been reviewed:    I concur with the findings and no changes have occurred since H&P was written.    Surgery risks, benefits and alternative options discussed and understood by patient/family.          Active Hospital Problems    Diagnosis  POA    *Osteoarthritis of right knee [M17.11]  Yes      Resolved Hospital Problems   No resolved problems to display.

## 2023-12-04 NOTE — PLAN OF CARE
Problem: Physical Therapy  Goal: Physical Therapy Goal  Description: Goals to be met by: 2023     Patient will increase functional independence with mobility by performin. Supine to sit with supervision.   2. Sit to supine with supervision.   3. Sit<>stand transfer with supervision using rolling walker.   4. Gait > 150 feet with SBA using rolling walker.         Outcome: Ongoing, Progressing     Patient evaluated by PT and goals established. Patient with significant  pain during therapy session. AAROM knee flexion ROM 0-70. Bed mobility with CAG, sit<>stand with CGA with RW, and amb x150ft with RW and CGA.  PT will continue to follow and progress as tolerated. Rec for d/c to home with therapy per MD. Please see progress note for detailed plan of care and recommendations.

## 2023-12-04 NOTE — TRANSFER OF CARE
"Anesthesia Transfer of Care Note    Patient: Steph Quigley    Procedure(s) Performed: Procedure(s) (LRB):  ARTHROPLASTY, KNEE, TOTAL (Right)    Patient location: PACU    Anesthesia Type: spinal    Transport from OR: Transported from OR on room air with adequate spontaneous ventilation    Post pain: adequate analgesia    Post assessment: no apparent anesthetic complications    Post vital signs: stable    Level of consciousness: awake    Nausea/Vomiting: no nausea/vomiting    Complications: none    Transfer of care protocol was followed      Last vitals: Visit Vitals  /81 (BP Location: Left arm, Patient Position: Sitting)   Pulse 79   Temp 36.7 °C (98.1 °F) (Oral)   Resp 18   Ht 5' 5" (1.651 m)   Wt 105.2 kg (232 lb)   LMP 06/01/2021 (Exact Date)   SpO2 100%   Breastfeeding No   BMI 38.61 kg/m²     "

## 2023-12-04 NOTE — OP NOTE
[facility]  Operative Report    SUMMARY     Surgery Date: 12/4/2023     Surgeon(s) and Role:     * Zoran Hardwick MD - Primary    Assistant: NIRMAL Carlin    Pre-op Diagnosis:  Osteoarthritis of right knee [M17.11]      Post-op Diagnosis:  Same    Procedure(s) (LRB):  ARTHROPLASTY, KNEE, TOTAL (Right) ([facility])    Anesthesia: Spinal    Description of Procedure: The appropriate consent was signed. The patient understood and except all risks and complications. The patient was brought to the Operating Room after undergoing spinal anesthetic. Tourniquet was applied to the proximal operative leg. The lower extremity was then prepped and draped in a sterile manner. After exsanguination of Esmarch bandage, tourniquet was inflated to 300 mmHg. An anterior incision with a medial parapatellar arthrotomy was performed. The menisci, anterior cruciate ligament and patellar fat pad were excised. The patella was resurfaced and sized to a 32 mm patella.   Three holes were then drilled for the lugs and this was trialed. A hole was then  drilled in the distal femur, holland was placed down the femoral canal and the   distal femur cut in 6 degrees of valgus. The tibia was then cut  with the extramedullary device   in 0 degree varus valgus with 5 degrees in posterior   slope. Extension block was placed and full extension was noted. The femur was   then sized to a #5 and #5 cutting block was placed and the anterior and   posterior cuts as well as chamfer cuts were performed. The tibia was sized to a  size D and a trial was performed.Trials were performed and a 10 mm spacer was felt to be appropriate.The tibia was then prepared with the drill and the punch, and trials were   removed and the knee washed out. Aquamantys was used to paint the posterior   capsule and corners. Palacos cement with gentamicin was then mixed and   pressurized sequentially in tibia, femur and patella. Components were impacted   and excess  cement was removed. A trial 10 mm spacer was placed and knee   brought out to full extension. Once the cement was allowed to harden, the 10 mm  spacer was felt to be appropriate and this was locked into the tibial   baseplate. Tourniquet was deflated and hemostasis was obtained. Good patellar   tracking was noted. Exparel cocktail was injected into the fascia and   subcutaneous tissue. The fascial closure was obtained with a running #2 Quill suture. Subcutaneous closure was obtained with #1 and 2-0 Vicryl. Skin was then closed with the staples and a sterile compressive dressing was applied. The patient was then brought to the Recovery Room in a good condition.        Estimated Blood Loss: 100cc         Specimens:   Specimen (24h ago, onward)      None

## 2023-12-04 NOTE — PLAN OF CARE
I certify I provided patient choice and a list to the patient/family of [facility].  Patient/Family signed Patient's Choice Disclosure Form choosing the following  [facility] sent referral to [facility]

## 2023-12-04 NOTE — ANESTHESIA PROCEDURE NOTES
Spinal    Diagnosis: knee osteoarthrtis  Patient location during procedure: holding area  Timeout: 12/4/2023 6:40 AM  End time: 12/4/2023 6:45 AM    Staffing  Authorizing Provider: Vasquez Hernandez MD  Performing Provider: Vasquez Hernandez MD    Staffing  Performed by: Vasquez Hernandez MD  Authorized by: Vasquez Hernandez MD    Preanesthetic Checklist  Completed: patient identified, IV checked, site marked, risks and benefits discussed, surgical consent, monitors and equipment checked, pre-op evaluation and timeout performed  Spinal Block  Patient position: sitting  Prep: ChloraPrep  Patient monitoring: heart rate, cardiac monitor and continuous pulse ox  Approach: right paramedian  Location: L3-4  Injection technique: single shot  CSF Fluid: clear free-flowing CSF  Needle  Needle type: Quincke   Needle gauge: 25 G  Needle length: 3.5 in  Additional Documentation: incremental injection  Needle localization: anatomical landmarks  Assessment  Sensory level: T10   Dermatomal levels determined by alcohol wipe  Ease of block: easy  Patient's tolerance of the procedure: comfortable throughout block and no complaints  Medications:    Medications: ropivacaine (NAROPIN) injection 0.5% - Intraspinal   3 mL - 12/4/2023 6:45:00 AM

## 2023-12-04 NOTE — DISCHARGE SUMMARY
[facility]  Short Stay  Discharge Summary  TOTAL KNEE REPLACEMENT    Admit Date: 12/4/2023    Discharge Date and Time: 12/4/2023  1:14 PM      Discharge Attending Physician: Zoran Hardwick MD    Hospital Course:  Steph Quigley,is a 53 y.o. female with severe osteoarthritis R knee, unrelieved with the conservative measures. The patient was admitted on  12/4/2023 and underwent R total knee replacement with computer-assisted navigation. Postoperatively, weightbearing as tolerated with a walker and CPM machine was initiated on postop day #1. Steph Quigley did quite well and was discharged on 12/4/2023  with home health physical therapy and nursing. The patient will be on Percocet for pain and aspirin 325 mg p.o. b.i.d. with meals x4 weeks.  A CPM machine will will be sent home with the patient. Postoperative follow up will be in the office in 4 weeks.       Final Diagnoses:    Principal Problem: Osteoarthritis of right knee   Secondary Diagnoses:   Active Hospital Problems    Diagnosis  POA    *Osteoarthritis of right knee [M17.11]  Yes      Resolved Hospital Problems   No resolved problems to display.       Discharged Condition: good    Disposition: Home-Health Care [facility]    Follow up/Patient Instructions:    Medications:  Reconciled Home Medications:      Medication List        START taking these medications      aspirin 81 MG Chew  Take 1 tablet (81 mg total) by mouth 2 (two) times daily.     oxyCODONE-acetaminophen 5-325 mg per tablet  Commonly known as: PERCOCET  Take 1 tablet by mouth every 4 hours as needed  or 2 tablets every 6 hours as needed            CHANGE how you take these medications      * ondansetron 4 MG Tbdl  Commonly known as: ZOFRAN-ODT  Take 1 tablet (4 mg total) by mouth every 8 (eight) hours as needed (nausea).  What changed: Another medication with the same name was added. Make sure you understand how and when to take each.     * ondansetron 8 MG Tbdl  Commonly known as:  ZOFRAN-ODT  Take 1 tablet (8 mg total) by mouth every 8 (eight) hours as needed (Nausea).  What changed: You were already taking a medication with the same name, and this prescription was added. Make sure you understand how and when to take each.           * This list has 2 medication(s) that are the same as other medications prescribed for you. Read the directions carefully, and ask your doctor or other care provider to review them with you.                CONTINUE taking these medications      * ALPRAZolam 2 MG Tab  Commonly known as: XANAX  Take 2 mg by mouth every 12 (twelve) hours as needed.     * ALPRAZolam 2 MG Tab  Commonly known as: XANAX  SMARTSI.5-1 Tablet(s) By Mouth Every 12 Hours PRN     * buPROPion 300 MG 24 hr tablet  Commonly known as: WELLBUTRIN XL  Take 300 mg by mouth once daily.     * buPROPion 300 MG 24 hr tablet  Commonly known as: WELLBUTRIN XL  Take 300 mg by mouth every evening.     * desvenlafaxine succinate 50 MG Tb24  Commonly known as: PRISTIQ  Take 50 mg by mouth every morning.     * desvenlafaxine succinate 50 MG Tb24  Commonly known as: PRISTIQ  Take 50 mg by mouth every evening.     lamoTRIgine 200 MG tablet  Commonly known as: LAMICTAL  Take 200 mg by mouth nightly.     PHEXXI 1.8-1-0.4 % Gel  Generic drug: lactic acid-citric-potassium  Place 1 applicator vaginally as needed (before sexual intercourse).     * VITAMIN D2 50,000 unit Cap  Generic drug: ergocalciferol  Take 50,000 Units by mouth every 7 days.     * ergocalciferol 50,000 unit Cap  Commonly known as: ERGOCALCIFEROL  Take 50,000 Units by mouth twice a week.     zolpidem 10 mg Tab  Commonly known as: AMBIEN           * This list has 8 medication(s) that are the same as other medications prescribed for you. Read the directions carefully, and ask your doctor or other care provider to review them with you.                Discharge Procedure Orders   WALKER FOR HOME USE     Order Specific Question Answer Comments   Type of  "Walker: Adult (5'4"-6'6")    With wheels? Yes    Height: 5' 5" (1.651 m)    Weight: 105.2 kg (232 lb)    Length of need (1-99 months): 99    Does patient have medical equipment at home? walker, rolling    Does patient have medical equipment at home? bedside commode    Please check all that apply: Patient is unable to safely ambulate without equipment.      3 IN 1 COMMODE FOR HOME USE     Order Specific Question Answer Comments   Type: Standard    Height: 5' 5" (1.651 m)    Weight: 105.2 kg (232 lb)    Does patient have medical equipment at home? walker, rolling    Does patient have medical equipment at home? bedside commode    Length of need (1-99 months): 99      BATH/SHOWER CHAIR FOR HOME USE     Order Specific Question Answer Comments   Height: 5' 5" (1.651 m)    Weight: 105.2 kg (232 lb)    Does patient have medical equipment at home? walker, rolling    Does patient have medical equipment at home? bedside commode    Length of need (1-99 months): 99    Type: With back      TRANSFER TUB BENCH FOR HOME USE     Order Specific Question Answer Comments   Type of Transfer Tub Bench: Unpadded    Height: 5' 5" (1.651 m)    Weight: 105.2 kg (232 lb)    Does patient have medical equipment at home? walker, rolling    Does patient have medical equipment at home? bedside commode    Length of need (1-99 months): 99    Patient notified - Not covered by insurance considered a convenience item Yes    Discussed financial responsibility with responsible party Yes      Diet general     Call MD for:  temperature >100.4     Call MD for:  persistent nausea and vomiting     Call MD for:  severe uncontrolled pain     Call MD for:  difficulty breathing, headache or visual disturbances     Call MD for:  redness, tenderness, or signs of infection (pain, swelling, redness, odor or green/yellow discharge around incision site)     Call MD for:  hives     Call MD for:  persistent dizziness or light-headedness     Call MD for:  extreme fatigue "      Follow-up Information       Zoran Hardwick MD Follow up in 4 week(s).    Specialty: Orthopedic Surgery  Contact information:  [address]  [facility]  [facility] [address]  [phone]               [facility] Follow up on 12/5/2023.    Specialties: Home Health Services, Home Therapy Services, Home Living Aide Services  Why: They will contact you to set up home healthcare appointments.  Contact information:  [address] [facility], [address] [id number] [phone]

## 2023-12-04 NOTE — PLAN OF CARE
LMSW met with patient via bedside. Patient will be discharging with [facility] as HH. Patient already has a RW and BSC. Patient's preferred pharmacy is [facility]. Patient's family will provide transportation home up discharge.   [facility] ([place])  Discharge Final Note    Primary Care Provider: Concepcion Chris NP    Expected Discharge Date: 12/4/2023    Final Discharge Note (most recent)       Final Note - 12/04/23 1018          Final Note    Assessment Type Final Discharge Note (P)      Anticipated Discharge Disposition Home-Health Care Svc (P)      Hospital Resources/Appts/Education Provided Provided patient/caregiver with written discharge plan information;Appointments scheduled and added to AVS (P)         Post-Acute Status    Post-Acute Authorization Home Health (P)      Home Health Status Set-up Complete/Auth obtained (P)      Discharge Delays None known at this time (P)                    Contact Info       Zoran Hardwick MD   Specialty: Orthopedic Surgery    [address]  [facility]  [facility] [address]   Phone: [phone]       Next Steps: Follow up in 4 week(s)    [facility]   Specialty: Home Health Services, Home Therapy Services, Home Living Aide Services    [address] [facility] [facility] [address]  [facility] [place] [address]   Phone: [phone]       Next Steps: Follow up on 12/5/2023    Instructions: They will contact you to set up home healthcare appointments.

## 2023-12-04 NOTE — PT/OT/SLP EVAL
Physical Therapy Evaluation and Treatment    Patient Name: Steph Quigley   MRN: [id number]  Recent Surgery: Procedure(s) (LRB):  ARTHROPLASTY, KNEE, TOTAL (Right) Day of Surgery    Recommendations:     Discharge Recommendations: Low Intensity Therapy   Discharge Equipment Recommendations: none   Barriers to discharge: None    Assessment:     Steph Quigley is a 53 y.o. female admitted with a medical diagnosis of Osteoarthritis of right knee. She presents with the following impairments/functional limitations: weakness, gait instability, decreased ROM, impaired endurance, impaired balance, decreased lower extremity function, impaired joint extensibility, impaired self care skills, pain, decreased coordination, impaired functional mobility.     Patient evaluated by PT and goals established. Patient with significant  pain during therapy session. AAROM knee flexion ROM 0-70. Bed mobility with CAG, sit<>stand with CGA with RW, and amb x150ft with RW and CGA.  PT will continue to follow and progress as tolerated. Rec for d/c to home with therapy per MD. Please see progress note for detailed plan of care and recommendations.     Rehab Prognosis: Good; patient would benefit from acute PT services to address these deficits and reach maximum level of function.    Plan:     During this hospitalization, patient to be seen BID to address the above listed problems via gait training, therapeutic activities, therapeutic exercises    Plan of Care Expires: 12/14/23    Subjective     Chief Complaint: significant pain in right knee  Patient Comments/Goals: Patient agrees to participate in PT evaluation.   Pain/Comfort:  Pain Rating 1: 10/10  Location - Side 1: Right  Location 1: knee  Pain Addressed 1: Reposition, Distraction, Cessation of Activity    Social History:  Living Environment: Patient lives with their daughter in a single story home with number of outside stair(s): 0  Prior Level of Function: Prior to admission, patient was  independent  Equipment Used at Home: walker, rolling, bedside commode  DME owned (not currently used): rolling walker and bedside commode  Assistance Upon Discharge: family    Objective:     Communicated with nursing prior to session. Patient found supine with peripheral IV, SCD, blood pressure cuff upon PT entry to room.    General Precautions: Standard, fall   Orthopedic Precautions: N/A   Braces: N/A    Respiratory Status: Room air    Exams:  Cognition:   Patient is oriented to name, , date, place, situation.  Pt follows approximately 100% of one step commands.    Mood: Pleasant and cooperative.   Musculoskeletal:  Posture: Protective guarding surgical joint  LE ROM/Strength: 5/5 bilateral hip flexion, nonsurgical knee extension, bilateral ankle dorsiflexion. AROM surgical knee difficult to accurately assess with large bulky dressing, although knee flexion grossly 0>90 degrees. Surgical knee strength grossly 5/5 knee flexion and 3-/5 knee extension.  Neuromuscular:  Sensation: Intact to light touch bilateral LEs. Pt denied paresthesias.   Coordination/Tone/Reflexes: No impairments identified with functional mobility. No formal testing performed.   Balance: CGA for dynamic standing with bilateral UE support.   Visual-vestibular: No impairments identified with functional mobility. No formal testing performed.  Integument:  Visible skin intact and surgical extremity dressing clean and dry.   Cardiopulmonary:  Edema: Mild surgical extremity.    Color/temperature/pulses: normal      Functional Mobility:  Gait belt applied - Yes  Bed Mobility  Supine to Sit: contact guard assistance for LE management and trunk management  Transfers  Sit to Stand: contact guard assistance with rolling walker and with cues for hand placement and foot placement  Toilet Transfer: contact guard assistance with rolling walker and with cues for hand placement and foot placement using Step Transfer  Gait  Patient ambulated 150 feet  with  rolling walker and contact guard assistance. Patient required cues   for decreasing toeing in, heel strike, position in walker, scapular depression, sequencing, step through gait pattern, stride length, and upright posture to increase independence and safety. Patient required cues ~ 50% of the time.  Balance  Sitting: GOOD+: Maintains balance through MAXIMAL excursions of active trunk motion  Standing: FAIR: Needs CONTACT GUARD during gait      Therapeutic Activities and Exercises:  Patient educated on role of acute care PT and PT POC, safety while in hospital including calling nurse for mobility, and call light usage.  Pt performed seated therapeutic exercises for strengthening and to promote circulation, pressure relief, and functional ROM with verbal, visual and tactile cues for correct performance including:   Bilateral ankle pumps x 10 reps  Bilateral quad sets x 10 reps  Bilateral glute sets x 10 reps   SLR x 10 reps   Heel slides x 10 reps   Hip abduction x 10 reps   LAQ x 10 reps      AM-PAC 6 CLICK MOBILITY  Total Score:18    Patient left up in chair with all lines intact and call button in reach.    GOALS:   Multidisciplinary Problems       Physical Therapy Goals          Problem: Physical Therapy    Goal Priority Disciplines Outcome Goal Variances Interventions   Physical Therapy Goal     PT, PT/OT Ongoing, Progressing     Description: Goals to be met by: 2023     Patient will increase functional independence with mobility by performin. Supine to sit with supervision.   2. Sit to supine with supervision.   3. Sit<>stand transfer with supervision using rolling walker.   4. Gait > 150 feet with SBA using rolling walker.                              History:     Past Medical History:   Diagnosis Date    Anxiety     Anxiety disorder, unspecified     Arthritis     Depression     Kidney stone     PONV (postoperative nausea and vomiting)     Smoker        Past Surgical History:   Procedure  Laterality Date    BREAST SURGERY      INTRAUTERINE DEVICE INSERTION  2016    [facility]    KIDNEY STONE SURGERY      removal    KIDNEY STONE SURGERY      kidney stone surgical removal Left     KNEE SURGERY Right 07/03/2019    TOTAL REDUCTION MAMMOPLASTY         Time Tracking:     PT Received On: 12/04/23  PT Start Time: 1038  PT Stop Time: 1108  PT Total Time (min): 30 min     Billable Minutes: Evaluation 5, Gait Training 15, and Therapeutic Exercise 10    12/4/2023

## 2023-12-18 DIAGNOSIS — Z96.651 PRESENCE OF RIGHT ARTIFICIAL KNEE JOINT: Primary | ICD-10-CM

## 2023-12-20 ENCOUNTER — CLINICAL SUPPORT (OUTPATIENT)
Dept: REHABILITATION | Facility: HOSPITAL | Age: 53
End: 2023-12-20
Payer: COMMERCIAL

## 2023-12-20 DIAGNOSIS — R29.898 DECREASED STRENGTH OF LOWER EXTREMITY: ICD-10-CM

## 2023-12-20 DIAGNOSIS — M25.661 DECREASED RANGE OF MOTION (ROM) OF RIGHT KNEE: Primary | ICD-10-CM

## 2023-12-20 PROCEDURE — 97110 THERAPEUTIC EXERCISES: CPT | Mod: PN

## 2023-12-20 PROCEDURE — 97161 PT EVAL LOW COMPLEX 20 MIN: CPT | Mod: PN

## 2023-12-20 PROCEDURE — 97112 NEUROMUSCULAR REEDUCATION: CPT | Mod: PN

## 2023-12-20 NOTE — PLAN OF CARE
[facility]  Physical Therapy Initial Evaluation    Date: 12/20/2023   Name: Steph Quigley  Clinic Number: [id number]    Therapy Diagnosis:   Encounter Diagnoses   Name Primary?    Decreased range of motion (ROM) of right knee Yes    Decreased strength of lower extremity      Physician: Zoran Hardwick MD    Physician Orders: PT Eval and Treat   Medical Diagnosis from Referral: Z96.651 (ICD-10-CM) - Presence of right artificial knee joint   Evaluation Date: 12/20/2023  Authorization Period Expiration: 12/17/2024  Plan of Care Expiration: 2/16/2024  Visit # / Visits authorized: 1/ 1    Time In: 700 am  Time Out: 745 am  Total Appointment Time (timed & untimed codes): 45 minutes    Precautions: Standard    Surgery Date: 12/4/2023   Procedure(s) (LRB):  ARTHROPLASTY, KNEE, TOTAL (Right) ([facility])    Subjective   Date of onset: 12/4/2023  History of current condition - Steph reports: right knee pain started had been going on for years prior to surgery. She denies any pain currently but states that walking anf getting up/down from a chair are difficulty. She denies any LOB or falls. She states she finished HH PT on Monday. She states her knee is sore around the incision site. 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 She was working [employer] prior to surgery.      Medical History:   Past Medical History:   Diagnosis Date    Anxiety     Anxiety disorder, unspecified     Arthritis     Depression     Kidney stone     PONV (postoperative nausea and vomiting)     Smoker        Surgical History:   Steph Quigley  has a past surgical history that includes Breast surgery; Intrauterine device insertion (2016); Knee surgery (Right, 07/03/2019); Kidney stone surgery; Total Reduction Mammoplasty; kidney stone surgical removal (Left); Kidney stone surgery; and Total knee arthroplasty (Right, 12/4/2023).    Medications:   Steph has a current medication list which includes the following  "prescription(s): alprazolam, alprazolam, aspirin, bupropion, bupropion, desvenlafaxine succinate, desvenlafaxine succinate, ergocalciferol, phexxi, lamotrigine, ondansetron, ondansetron, oxycodone-acetaminophen, vitamin d2, and zolpidem.    Allergies:   Review of patient's allergies indicates:   Allergen Reactions    Ketorolac Other (See Comments)    Three-leaf caper Other (See Comments)     "FOOD ALLERGY MAKES ME FEEL LIKE I'M HAVING A STROKE"    Toradol [ketorolac] Itching and Other (See Comments)     NUMBNESS ALSO ALLERGIC TO ELSIE - HIGH BP          Imaging, none    Prior Therapy: HH, Y  Social History:  lives with their family  Occupation: LPN  Prior Level of Function: I  Current Level of Function: I; limited function post op TKA    Pain:  Current 0/10, worst 4/10, best 0/10   Location: right knee  Description: Aching  Aggravating Factors: Standing and Walking  Easing Factors: rest    Patients goals: get back to work    Objective   Gait:   No assistive device   Decreased knee EXT    Decreased step length    Observation: calm and cooperative; mild swelling; incision site is well healed    Range of Motion:   Knee Left active Left Passive Right Active R passive   Flexion 125 130 96 100   Extension 0 +3 -10 -5       Joint Mobility:  (R) patellar hypomobility in superior / inferior directions  Tibiofemoral: R: soft tissue stretch    Lower Extremity Strength    Quad Set: fair; unable to e-stim due to long pants   SLR: unable       Functional Testing:     TUG - 9.5s without AD     Table: Population Norms for TUG    Age  Average TUG    60 - 69 years  8.1 seconds    70 - 79 years  9.2 seconds    80 - 99 years  11.3 seconds              Evaluation   5xSTS 19s completed with B arms support        Sensation: WNL    Flexibility: tight HS      Limitation/Restriction for FOTO Knee Survey    Therapist reviewed FOTO scores for Steph Quigley on 12/20/2023.   FOTO documents entered into [employer] - see Media section.    Limitation " Score: 61%  Predcited Limitation Score: 75%         TREATMENT   Treatment Time In: 715am  Treatment Time Out: 740am  Total Treatment time (time-based codes) separate from Evaluation: 25 minutes    Steph received therapeutic exercises to develop strength, endurance, ROM, and flexibility for 15 minutes including:    NuStep 6 minutes; level1  for ROM   Heel prop x 4 minutes   Seated HS Stretch 5 x 30s  Education - HEP / no pillow under knee at night    Steph received the following neuromuscular re-education to promote sense, kinesthesia, and proprioception for 10 minutes, including:    Seated LAQ 2 x 10; 3sec eccentric   Seated Quad sets with cueing for TKE; 10x10s holds  Supine Quad sets with cueing for TKE 10x10s holds      Home Exercises and Patient Education Provided    Education provided:   - HEP  - POC/prognosis    Written Home Exercises Provided: yes.  Exercises were reviewed and Steph was able to demonstrate them prior to the end of the session.  Steph demonstrated good  understanding of the education provided.     See EMR under Patient Instructions for exercises provided 12/20/2023.    Assessment   Steph is a 53 y.o. female referred to outpatient Physical Therapy with a medical diagnosis of Presence of right artificial knee joint. Pt presents with limited knee AROM; LE weakness; tightness of HS, and functional limitations of difficulty with sit to stands and prolonged walking. Patient would benefit from skilled PT to address these impairments and facilitate a return to PLOF.     Pt to be seen 2x/week for 8x weeks     Patient prognosis is Good.   Patientt will benefit from skilled outpatient Physical Therapy to address the deficits stated above and in the chart below, provide patient /family education, and to maximize patientt's level of independence.     Plan of care discussed with patient: Yes  Patient's spiritual, cultural and educational needs considered and patient is agreeable to the plan of care and  goals as stated below:     Anticipated Barriers for therapy: none    Medical Necessity is demonstrated by the following  History  Co-morbidities and personal factors that may impact the plan of care Co-morbidities:   anxiety, depression, and high BMI    Personal Factors:   no deficits     moderate   Examination  Body Structures and Functions, activity limitations and participation restrictions that may impact the plan of care Body Regions:   lower extremities  trunk    Body Systems:    gross symmetry  ROM  strength  gross coordinated movement  balance  gait  transfers  transitions  motor control  edema    Participation Restrictions:   work    Activity limitations:     no deficits    General Tasks and Commands  no deficits    Communication  no deficits    Mobility  lifting and carrying objects  walking    Self care  no deficits    Domestic Life  doing house work (cleaning house, washing dishes, laundry)  assisting others    Interactions/Relationships  no deficits    Life Areas  employment    Community and Social Life  community life  recreation and leisure         low   Clinical Presentation stable and uncomplicated low   Decision Making/ Complexity Score: low     Goals:  Short Term Goals: (4 weeks)  1. Pt will be independent with HEP in order to supplement patient in improving functional mobility. - progressing, not met  2. Pt will improve (R) knee AROM to at least 0-120 in order to improve gait. - progressing, not met  3. Pt will improve quad activation and strength to perform a SLR with no lag for 10 reps - progressing, not met  4. Pt will improve CKC quad control to facilitate normal mid stance and walking - progressing, not met     Long Term Goals: (8 weeks)  1. Pt will be independent with updated HEP supplement PT in improving functional mobility. - progressing, not met  2. Pt will improve report no difficulty or pain with ADL's- progressing, not met  3. Pt will improve FOTO knee survey score to </= predicted %  limited in order to demo improved functional mobility. - progressing, not met  4. Pt will perform 5x STS to <13s  in order to demo improved ability to perform transfers efficiently. - progressing, not met    Plan   Plan of care Certification: 12/20/2023 to 2/16/2024.    Outpatient Physical Therapy 2 times weekly for 8 weeks to include the following interventions: Gait Training, Manual Therapy, Moist Heat/ Ice, Neuromuscular Re-ed, Patient Education, Self Care, Therapeutic Activities, and Therapeutic Exercise.     Marko Frost, PT

## 2023-12-28 ENCOUNTER — CLINICAL SUPPORT (OUTPATIENT)
Dept: REHABILITATION | Facility: HOSPITAL | Age: 53
End: 2023-12-28
Payer: COMMERCIAL

## 2023-12-28 DIAGNOSIS — M25.661 DECREASED RANGE OF MOTION (ROM) OF RIGHT KNEE: Primary | ICD-10-CM

## 2023-12-28 DIAGNOSIS — R29.898 DECREASED STRENGTH OF LOWER EXTREMITY: ICD-10-CM

## 2023-12-28 PROCEDURE — 97110 THERAPEUTIC EXERCISES: CPT | Mod: PN

## 2023-12-28 PROCEDURE — 97112 NEUROMUSCULAR REEDUCATION: CPT | Mod: PN

## 2023-12-28 PROCEDURE — 97140 MANUAL THERAPY 1/> REGIONS: CPT | Mod: PN

## 2023-12-28 PROCEDURE — 97530 THERAPEUTIC ACTIVITIES: CPT | Mod: PN

## 2023-12-28 NOTE — PROGRESS NOTES
Physical Therapy Treatment Note     Name: Steph Quigley  Clinic Number: [id number]    Therapy Diagnosis:   Encounter Diagnoses   Name Primary?    Decreased range of motion (ROM) of right knee Yes    Decreased strength of lower extremity      Physician: Zroan Hardwick MD    Visit Date: 12/28/2023    Physician Orders: PT Eval and Treat   Medical Diagnosis from Referral: Z96.651 (ICD-10-CM) - Presence of right artificial knee joint   Evaluation Date: 12/20/2023  Authorization Period Expiration: 12/31/2023  Plan of Care Expiration: 2/16/2024  Visit # / Visits authorized: 2/ 10     Time In: 100 pm  Time Out: 153 pm  Total Appointment Time (timed & untimed codes): 53 minutes     Precautions: Standard     Surgery Date: 12/4/2023   Procedure(s) (LRB):  ARTHROPLASTY, KNEE, TOTAL (Right) ([facility])    Subjective     Pt reports: she has not had her normal pain medication which has limited her. She states she has been trying to do her exercises.    She was compliant with home exercise program.  Response to previous treatment: felt fine  Functional change: ongoing    Pain: 3/10  Location: right knee      Objective     ROM: 0-110  Quad set: fair   SLR: unable without lag      Steph received therapeutic exercises to develop strength, endurance, ROM, and flexibility for 15 minutes including:    Assessment above  Recumbent Bike 5 minutes  Heel prop x 4 minutes 3#  Seated HS Stretch 3 x 30s  Education - HEP / no pillow under knee at night / extension + quad sets every hour     Steph received the following neuromuscular re-education to promote sense, kinesthesia, and proprioception for 20 minutes, including:    Supine NMES Quad set 10s on/10s off - 5 minutes   Seated NMES Quad set + SLR 5s on/10s off - 3 minutes  Standing NMES TKE BTB 5s on/10s off - 4 minutes   Seated LAQ 3 x 10; 3sec eccentric 10#  Standing Hip Abd 2 x 10   Standing Hip Ext 2 x 10     Steph received the following manual therapy techniques: Joint  mobilizations were applied to the: R knee for 8 minutes, including:    Tibiofemoral PAs; grade 3   Superior/inferior patella glides; grade 3     Steph participated in dynamic functional therapeutic activities to improve functional performance for 10  minutes, including:    DL Shuttle 75# 3 x 10   SL Shuttle 25# 3 x 10     Home Exercises Provided and Patient Education Provided     Education provided:   - HEP    Written Home Exercises Provided: yes.  Exercises were reviewed and Steph was able to demonstrate them prior to the end of the session.  Steph demonstrated good  understanding of the education provided.     See EMR under Patient Instructions for exercises provided prior visit.    Assessment     Steph tolerated tx well today. She demonstrates improved ROM with TKE able to be achieved after light stretching. She was reminded to continue to stretch her knee every hour and f/u with quad sets. Will continue to progress as tolerated.     Steph Is progressing well towards her goals.   Pt prognosis is Good.     Pt will continue to benefit from skilled outpatient physical therapy to address the deficits listed in the problem list box on initial evaluation, provide pt/family education and to maximize pt's level of independence in the home and community environment.     Pt's spiritual, cultural and educational needs considered and pt agreeable to plan of care and goals.     Anticipated barriers to physical therapy: none    Goals:  Short Term Goals: (4 weeks)  1. Pt will be independent with HEP in order to supplement patient in improving functional mobility. - progressing, not met  2. Pt will improve (R) knee AROM to at least 0-120 in order to improve gait. - progressing, not met  3. Pt will improve quad activation and strength to perform a SLR with no lag for 10 reps - progressing, not met  4. Pt will improve CKC quad control to facilitate normal mid stance and walking - progressing, not met     Long Term Goals: (8  weeks)  1. Pt will be independent with updated HEP supplement PT in improving functional mobility. - progressing, not met  2. Pt will improve report no difficulty or pain with ADL's- progressing, not met  3. Pt will improve FOTO knee survey score to </= predicted % limited in order to demo improved functional mobility. - progressing, not met  4. Pt will perform 5x STS to <13s  in order to demo improved ability to perform transfers efficiently. - progressing, not met     Plan   Plan of care Certification: 12/20/2023 to 2/16/2024.       Marko Frost, PT , OCS

## 2024-01-02 ENCOUNTER — CLINICAL SUPPORT (OUTPATIENT)
Dept: REHABILITATION | Facility: HOSPITAL | Age: 54
End: 2024-01-02
Payer: COMMERCIAL

## 2024-01-02 DIAGNOSIS — R29.898 DECREASED STRENGTH OF LOWER EXTREMITY: ICD-10-CM

## 2024-01-02 DIAGNOSIS — M25.661 DECREASED RANGE OF MOTION (ROM) OF RIGHT KNEE: Primary | ICD-10-CM

## 2024-01-02 PROCEDURE — 97112 NEUROMUSCULAR REEDUCATION: CPT | Mod: PN

## 2024-01-02 PROCEDURE — 97530 THERAPEUTIC ACTIVITIES: CPT | Mod: PN

## 2024-01-02 PROCEDURE — 97110 THERAPEUTIC EXERCISES: CPT | Mod: PN

## 2024-01-02 NOTE — PROGRESS NOTES
Physical Therapy Treatment Note     Name: Steph Quigley  Clinic Number: [id number]    Therapy Diagnosis:   Encounter Diagnoses   Name Primary?    Decreased range of motion (ROM) of right knee Yes    Decreased strength of lower extremity        Physician: Zoran Hardwick MD    Visit Date: 1/2/2024    Physician Orders: PT Eval and Treat   Medical Diagnosis from Referral: Z96.651 (ICD-10-CM) - Presence of right artificial knee joint   Evaluation Date: 12/20/2023  Authorization Period Expiration: 12/31/2023  Plan of Care Expiration: 2/16/2024  Visit # / Visits authorized: 3/ 10     Time In: 300 pm  Time Out: 354 pm  Total Appointment Time (timed & untimed codes): 54 minutes     Precautions: Standard     Surgery Date: 12/4/2023   Procedure(s) (LRB):  ARTHROPLASTY, KNEE, TOTAL (Right) ([facility])    Subjective     Pt reports: she feels like she is improving. Has been walking better.     She was compliant with home exercise program.  Response to previous treatment: felt fine  Functional change: ongoing    Pain: 3/10  Location: right knee      Objective     ROM: +2-0-110  Quad set: fair +  SLR: able without lag    tSeph received therapeutic exercises to develop strength, endurance, ROM, and flexibility for 8 minutes including:    Assessment above  Recumbent Bike 5 minutes  Education - HEP / no pillow under knee at night / extension + quad sets every hour     Not today:   Heel prop x 4 minutes 3#    Steph received the following neuromuscular re-education to promote sense, kinesthesia, and proprioception for 23 minutes, including:    Long Sitting NMES Quad set 10s on/10s off - 5 minutes   Long Sitting NMES Quad set + SLR 5s on/10s off - 5 minutes  Seated LAQ 3 x 10; 3sec eccentric 10#  Standing [place] 3 x 5 B 2#    Steph received the following manual therapy techniques: Joint mobilizations were applied to the: R knee for 0 minutes, including:    Tibiofemoral PAs; grade 3   Superior/inferior patella glides; grade 3      Steph participated in dynamic functional therapeutic activities to improve functional performance for 23  minutes, including:    DL Shuttle 87# 3 x 10   SL Shuttle 50# 3 x 8   Sit to stands on 24inch step with split stance 3 x 10   Backwards Walking on turf 4 x 10 yards     Home Exercises Provided and Patient Education Provided     Education provided:   - HEP    Written Home Exercises Provided: yes.  Exercises were reviewed and Steph was able to demonstrate them prior to the end of the session.  Steph demonstrated good  understanding of the education provided.     See EMR under Patient Instructions for exercises provided prior visit.    Assessment     Steph tolerated tx well today. She was able to complete a SLR with no lag today which is encouraging. She continue to require cueing for TKE with gait and with knee control in CKC activities. Will continue to progress as tolerated.     Steph Is progressing well towards her goals.   Pt prognosis is Good.     Pt will continue to benefit from skilled outpatient physical therapy to address the deficits listed in the problem list box on initial evaluation, provide pt/family education and to maximize pt's level of independence in the home and community environment.     Pt's spiritual, cultural and educational needs considered and pt agreeable to plan of care and goals.     Anticipated barriers to physical therapy: none    Goals:  Short Term Goals: (4 weeks)  1. Pt will be independent with HEP in order to supplement patient in improving functional mobility. - progressing, not met  2. Pt will improve (R) knee AROM to at least 0-120 in order to improve gait. - progressing, not met  3. Pt will improve quad activation and strength to perform a SLR with no lag for 10 reps - progressing, not met  4. Pt will improve CKC quad control to facilitate normal mid stance and walking - progressing, not met     Long Term Goals: (8 weeks)  1. Pt will be independent with updated HEP  supplement PT in improving functional mobility. - progressing, not met  2. Pt will improve report no difficulty or pain with ADL's- progressing, not met  3. Pt will improve FOTO knee survey score to </= predicted % limited in order to demo improved functional mobility. - progressing, not met  4. Pt will perform 5x STS to <13s  in order to demo improved ability to perform transfers efficiently. - progressing, not met     Plan   Plan of care Certification: 12/20/2023 to 2/16/2024.       Marko Frost, PT , OCS

## 2024-01-04 ENCOUNTER — CLINICAL SUPPORT (OUTPATIENT)
Dept: REHABILITATION | Facility: HOSPITAL | Age: 54
End: 2024-01-04
Payer: COMMERCIAL

## 2024-01-04 DIAGNOSIS — R29.898 DECREASED STRENGTH OF LOWER EXTREMITY: ICD-10-CM

## 2024-01-04 DIAGNOSIS — M25.661 DECREASED RANGE OF MOTION (ROM) OF RIGHT KNEE: Primary | ICD-10-CM

## 2024-01-04 PROCEDURE — 97110 THERAPEUTIC EXERCISES: CPT | Mod: PN

## 2024-01-04 PROCEDURE — 97112 NEUROMUSCULAR REEDUCATION: CPT | Mod: PN

## 2024-01-04 PROCEDURE — 97530 THERAPEUTIC ACTIVITIES: CPT | Mod: PN

## 2024-01-04 NOTE — PROGRESS NOTES
Physical Therapy Treatment Note     Name: Steph Quigley  [facility] Number: [id number]    Therapy Diagnosis:   Encounter Diagnoses   Name Primary?    Decreased range of motion (ROM) of right knee Yes    Decreased strength of lower extremity      Physician: Zoran Hardwick MD    Visit Date: 1/4/2024    Physician Orders: PT Eval and Treat   Medical Diagnosis from Referral: Z96.651 (ICD-10-CM) - Presence of right artificial knee joint   Evaluation Date: 12/20/2023  Authorization Period Expiration: 12/31/2023  Plan of Care Expiration: 2/16/2024  Visit # / Visits authorized: 4 / 10     Time In: 300 pm  Time Out: 353 pm  Total Appointment Time (timed & untimed codes): 53 minutes     Precautions: Standard     Surgery Date: 12/4/2023   Procedure(s) (LRB):  ARTHROPLASTY, KNEE, TOTAL (Right) ([facility])    Subjective     Pt reports: she feels like she is improving. She reports she is walking better but had a dizzy spell last night and was concerned. She reports her BP is usually not high. She states she up all last night. She denies any symptoms currently but that the dizziness stressed her out. She states she only ate 1 meal yesterday so that could be it.     She was compliant with home exercise program.  Response to previous treatment: felt fine  Functional change: ongoing    Pain: 3/10  Location: right knee      Objective     ROM: +2-0-110 degrees  Quad set: fair +  SLR: able without lag    BP: 160/92  HR: 74bpm    Steph received therapeutic exercises to develop strength, endurance, ROM, and flexibility for 15 minutes including:    Assessment above  Supine SLR 3# 3 x 10   DL Bridge 3 x 10   Education - HEP / no pillow under knee at night / extension + quad sets every hour     Not today:   Heel prop x 4 minutes 3#    Steph received the following neuromuscular re-education to promote sense, kinesthesia, and proprioception for 20 minutes, including:    SL Hip Abd 3 x 10 3# cueing for pelvic control  Seated LAQ 3 x 10;  3sec eccentric 10#    Steph received the following manual therapy techniques: Joint mobilizations were applied to the: R knee for 0 minutes, including:    Tibiofemoral PAs; grade 3   Superior/inferior patella glides; grade 3     Steph participated in dynamic functional therapeutic activities to improve functional performance for 23 minutes, including:    DL Shuttle 87# 3 x 10   SL Shuttle 50# 3 x 8   SL Sit to stands on 24 inch step + foam pad 3 x 10; 1 UE support with SBA  Fwd/Bwd unloaded Sled Push 3 x 10 yards   6 inch Step Ups 3 x 8     Home Exercises Provided and Patient Education Provided     Education provided:   - HEP    Written Home Exercises Provided: yes.  Exercises were reviewed and Steph was able to demonstrate them prior to the end of the session.  Steph demonstrated good  understanding of the education provided.     See EMR under Patient Instructions for exercises provided prior visit.    Assessment     Steph tolerated tx well today. Able to tolerate increased loading within session without increase in symptoms. She is improving her tolerance to exercises and requiring less cueing each session. She would continue to benefit from skilled PT to facilitate a return to PLOF. Pt educated to take BP this afternoon and if high/symptoms return to notify MD regarding dizziness/high pressure.     Steph Is progressing well towards her goals.   Pt prognosis is Good.     Pt will continue to benefit from skilled outpatient physical therapy to address the deficits listed in the problem list box on initial evaluation, provide pt/family education and to maximize pt's level of independence in the home and community environment.     Pt's spiritual, cultural and educational needs considered and pt agreeable to plan of care and goals.     Anticipated barriers to physical therapy: none    Goals:  Short Term Goals: (4 weeks)  1. Pt will be independent with HEP in order to supplement patient in improving functional mobility.  - progressing, not met  2. Pt will improve (R) knee AROM to at least 0-120 in order to improve gait. - progressing, not met  3. Pt will improve quad activation and strength to perform a SLR with no lag for 10 reps - progressing, not met  4. Pt will improve CKC quad control to facilitate normal mid stance and walking - progressing, not met     Long Term Goals: (8 weeks)  1. Pt will be independent with updated HEP supplement PT in improving functional mobility. - progressing, not met  2. Pt will improve report no difficulty or pain with ADL's- progressing, not met  3. Pt will improve FOTO knee survey score to </= predicted % limited in order to demo improved functional mobility. - progressing, not met  4. Pt will perform 5x STS to <13s  in order to demo improved ability to perform transfers efficiently. - progressing, not met     Plan   Plan of care Certification: 12/20/2023 to 2/16/2024.       Marko Frost, PT , OCS

## 2024-01-10 ENCOUNTER — CLINICAL SUPPORT (OUTPATIENT)
Dept: REHABILITATION | Facility: HOSPITAL | Age: 54
End: 2024-01-10
Payer: COMMERCIAL

## 2024-01-10 DIAGNOSIS — M25.661 DECREASED RANGE OF MOTION (ROM) OF RIGHT KNEE: Primary | ICD-10-CM

## 2024-01-10 DIAGNOSIS — R29.898 DECREASED STRENGTH OF LOWER EXTREMITY: ICD-10-CM

## 2024-01-10 PROCEDURE — 97112 NEUROMUSCULAR REEDUCATION: CPT | Mod: PN,CQ

## 2024-01-10 PROCEDURE — 97530 THERAPEUTIC ACTIVITIES: CPT | Mod: PN,CQ

## 2024-01-10 PROCEDURE — 97110 THERAPEUTIC EXERCISES: CPT | Mod: PN,CQ

## 2024-01-10 NOTE — PROGRESS NOTES
Physical Therapy Treatment Note     Name: Steph Quigley  Clinic Number: [id number]    Therapy Diagnosis:   Encounter Diagnoses   Name Primary?    Decreased range of motion (ROM) of right knee Yes    Decreased strength of lower extremity      Physician: Zoran Hardwick MD    Visit Date: 1/10/2024    Physician Orders: PT Eval and Treat   Medical Diagnosis from Referral: Z96.651 (ICD-10-CM) - Presence of right artificial knee joint   Evaluation Date: 12/20/2023  Authorization Period Expiration: 12/31/2023  Plan of Care Expiration: 2/16/2024  Visit # / Visits authorized: 5 / 10     Time In: 205 pm  Time Out: 300 pm  Total Appointment Time (timed & untimed codes): 55 minutes     Precautions: Standard     Surgery Date: 12/4/2023   Procedure(s) (LRB):  ARTHROPLASTY, KNEE, TOTAL (Right) ([facility])    Subjective     Pt reports: little swelling, but doing alright. No BP issues to report since that last visit.   She was compliant with home exercise program.  Response to previous treatment: no issues stated   Functional change: ongoing    Pain: 3/10  Location: right knee      Objective     ROM: +2-0-110 degrees  Quad set: fair +  SLR: able without lag    Steph received therapeutic exercises to develop strength, endurance, ROM, and flexibility for 15 minutes including:    Heel prop x 4 minutes 3#  Supine SLR 3# 3 x 10   DL Bridge 3 x 10     Steph received the following neuromuscular re-education to promote sense, kinesthesia, and proprioception for 20 minutes, including:    SL Hip Abd 3 x 10  cueing for pelvic control  Seated LAQ 3 x 10; 3sec eccentric 10#    Steph received the following manual therapy techniques: Joint mobilizations were applied to the: R knee for 0 minutes, including:    Tibiofemoral PAs; grade 3   Superior/inferior patella glides; grade 3     Steph participated in dynamic functional therapeutic activities to improve functional performance for 23 minutes, including:    DL Shuttle 87# 3 x 10   SL  Shuttle 50# 3 x 8   SL Sit to stands on 24 inch step + foam pad 3 x 10; 1 UE support with SBA  Fwd/Bwd unloaded Sled Push 3 x 10 yards   6 inch Step Ups 3 x 8     Home Exercises Provided and Patient Education Provided     Education provided:   - HEP    Written Home Exercises Provided: yes.  Exercises were reviewed and Steph was able to demonstrate them prior to the end of the session.  Steph demonstrated good  understanding of the education provided.     See EMR under Patient Instructions for exercises provided prior visit.    Assessment     Required cueing for slow pace to improve control and strength during exercises, pain in knee during side lying hip abduction with weight on ankle. Minor limitation in knee flexion, but progressing well with improved gait mechanics. Challenged with quad strengthening exercises. Continue as tolerated to maximize to tolerance.     Steph Is progressing well towards her goals.   Pt prognosis is Good.     Pt will continue to benefit from skilled outpatient physical therapy to address the deficits listed in the problem list box on initial evaluation, provide pt/family education and to maximize pt's level of independence in the home and community environment.     Pt's spiritual, cultural and educational needs considered and pt agreeable to plan of care and goals.     Anticipated barriers to physical therapy: none    Goals:  Short Term Goals: (4 weeks)  1. Pt will be independent with HEP in order to supplement patient in improving functional mobility. - progressing, not met  2. Pt will improve (R) knee AROM to at least 0-120 in order to improve gait. - progressing, not met  3. Pt will improve quad activation and strength to perform a SLR with no lag for 10 reps - progressing, not met  4. Pt will improve CKC quad control to facilitate normal mid stance and walking - progressing, not met     Long Term Goals: (8 weeks)  1. Pt will be independent with updated HEP supplement PT in improving  functional mobility. - progressing, not met  2. Pt will improve report no difficulty or pain with ADL's- progressing, not met  3. Pt will improve FOTO knee survey score to </= predicted % limited in order to demo improved functional mobility. - progressing, not met  4. Pt will perform 5x STS to <13s  in order to demo improved ability to perform transfers efficiently. - progressing, not met     Plan   Plan of care Certification: 12/20/2023 to 2/16/2024.       Michelle Wild, PTA

## 2024-01-12 ENCOUNTER — CLINICAL SUPPORT (OUTPATIENT)
Dept: REHABILITATION | Facility: HOSPITAL | Age: 54
End: 2024-01-12
Payer: COMMERCIAL

## 2024-01-12 DIAGNOSIS — R29.898 DECREASED STRENGTH OF LOWER EXTREMITY: ICD-10-CM

## 2024-01-12 DIAGNOSIS — M25.661 DECREASED RANGE OF MOTION (ROM) OF RIGHT KNEE: Primary | ICD-10-CM

## 2024-01-12 PROCEDURE — 97530 THERAPEUTIC ACTIVITIES: CPT | Mod: PN

## 2024-01-12 PROCEDURE — 97110 THERAPEUTIC EXERCISES: CPT | Mod: PN

## 2024-01-12 PROCEDURE — 97112 NEUROMUSCULAR REEDUCATION: CPT | Mod: PN

## 2024-01-12 NOTE — PROGRESS NOTES
Physical Therapy Treatment Note     Name: Steph Quigley  Clinic Number: [id number]    Therapy Diagnosis:   Encounter Diagnoses   Name Primary?    Decreased range of motion (ROM) of right knee Yes    Decreased strength of lower extremity      Physician: Zoran Hardwick MD    Visit Date: 1/12/2024    Physician Orders: PT Eval and Treat   Medical Diagnosis from Referral: Z96.651 (ICD-10-CM) - Presence of right artificial knee joint   Evaluation Date: 12/20/2023  Authorization Period Expiration: 12/31/2023  Plan of Care Expiration: 2/16/2024  Visit # / Visits authorized: 5 / 10     Time In: 130 pm  Time Out: 225pm  Total Appointment Time (timed & untimed codes): 55 minutes     Precautions: Standard     Surgery Date: 12/4/2023   Procedure(s) (LRB):  ARTHROPLASTY, KNEE, TOTAL (Right) ([facility])    Subjective     Pt reports: some pain had some tylenol before therapy, still swelling some   She was compliant with home exercise program.  Response to previous treatment: no issues stated   Functional change: ongoing    Pain: 3/10  Location: right knee      Objective     ROM: +2-0-110 degrees  Quad set: fair +  SLR: able without lag    Steph received therapeutic exercises to develop strength, endurance, ROM, and flexibility for 15 minutes including:    Heel prop x 4 minutes 3#  Supine SLR 3# 3 x 10   DL Bridge 3 x 10     Steph received the following neuromuscular re-education to promote sense, kinesthesia, and proprioception for 20 minutes, including:    SL Hip Abd 3 x 10  cueing for pelvic control  Seated LAQ 3 x 10; 3sec eccentric 10#    Steph received the following manual therapy techniques: Joint mobilizations were applied to the: R knee for 0 minutes, including:    Tibiofemoral PAs; grade 3   Superior/inferior patella glides; grade 3     Steph participated in dynamic functional therapeutic activities to improve functional performance for 23 minutes, including:    DL Shuttle 87# 3 x 10   SL Shuttle 50# 3 x 8   SL  Sit to stands on 24 inch step + foam pad 3 x 10; 1 UE support with SBA  Fwd/Bwd unloaded Sled Push 3 x 10 yards   6 inch Step Ups 3 x 8     Home Exercises Provided and Patient Education Provided     Education provided:   - HEP    Written Home Exercises Provided: yes.  Exercises were reviewed and Steph was able to demonstrate them prior to the end of the session.  Steph demonstrated good  understanding of the education provided.     See EMR under Patient Instructions for exercises provided prior visit.    Assessment     Patient has improved knee control no lag with straight leg raise.  Improving strength, challenged with single leg sit to stands . Continue as tolerated to maximize to tolerance.     Steph Is progressing well towards her goals.   Pt prognosis is Good.     Pt will continue to benefit from skilled outpatient physical therapy to address the deficits listed in the problem list box on initial evaluation, provide pt/family education and to maximize pt's level of independence in the home and community environment.     Pt's spiritual, cultural and educational needs considered and pt agreeable to plan of care and goals.     Anticipated barriers to physical therapy: none    Goals:  Short Term Goals: (4 weeks)  1. Pt will be independent with HEP in order to supplement patient in improving functional mobility. - progressing, not met  2. Pt will improve (R) knee AROM to at least 0-120 in order to improve gait. - progressing, not met  3. Pt will improve quad activation and strength to perform a SLR with no lag for 10 reps - progressing, not met  4. Pt will improve CKC quad control to facilitate normal mid stance and walking - progressing, not met     Long Term Goals: (8 weeks)  1. Pt will be independent with updated HEP supplement PT in improving functional mobility. - progressing, not met  2. Pt will improve report no difficulty or pain with ADL's- progressing, not met  3. Pt will improve FOTO knee survey score to  </= predicted % limited in order to demo improved functional mobility. - progressing, not met  4. Pt will perform 5x STS to <13s  in order to demo improved ability to perform transfers efficiently. - progressing, not met     Plan   Plan of care Certification: 12/20/2023 to 2/16/2024.       Sai Laird, PT

## 2024-01-18 NOTE — PROGRESS NOTES
Physical Therapy Treatment Note     Name: Steph Quigley  Clinic Number: [id number]    Therapy Diagnosis:   No diagnosis found.    Physician: Zoran Hardwick MD    Visit Date: 1/19/2024    Physician Orders: PT Eval and Treat   Medical Diagnosis from Referral: Z96.651 (ICD-10-CM) - Presence of right artificial knee joint   Evaluation Date: 12/20/2023  Authorization Period Expiration: 12/31/2023  Plan of Care Expiration: 2/16/2024  Visit # / Visits authorized:  / 10     Time In:   Time Out:   Total Appointment Time (timed & untimed codes):  minutes     Precautions: Standard     Surgery Date: 12/4/2023   Procedure(s) (LRB):  ARTHROPLASTY, KNEE, TOTAL (Right) ([facility])    Subjective     Pt reports:   She was compliant with home exercise program.  Response to previous treatment: no issues stated   Functional change: ongoing    Pain: /10  Location: right knee      Objective     ROM: +2-0-110 degrees  Quad set: fair +  SLR: able without lag    Steph received therapeutic exercises to develop strength, endurance, ROM, and flexibility for 15 minutes including:    Heel prop x 4 minutes 3#  Supine SLR 3# 3 x 10   DL Bridge 3 x 10     Steph received the following neuromuscular re-education to promote sense, kinesthesia, and proprioception for 20 minutes, including:    SL Hip Abd 3 x 10  cueing for pelvic control  Seated LAQ 3 x 10; 3sec eccentric 10#    Steph received the following manual therapy techniques: Joint mobilizations were applied to the: R knee for 0 minutes, including:    Tibiofemoral PAs; grade 3   Superior/inferior patella glides; grade 3     Steph participated in dynamic functional therapeutic activities to improve functional performance for 23 minutes, including:    DL Shuttle 87# 3 x 10   SL Shuttle 50# 3 x 8   SL Sit to stands on 24 inch step + foam pad 3 x 10; 1 UE support with SBA  Fwd/Bwd unloaded Sled Push 3 x 10 yards   6 inch Step Ups 3 x 8     Home Exercises Provided and Patient Education  Provided     Education provided:   - HEPn     Written Home Exercises Provided: yes.  Exercises were reviewed and Steph was able to demonstrate them prior to the end of the session.  Steph demonstrated good  understanding of the education provided.     See EMR under Patient Instructions for exercises provided prior visit.    Assessment     Patient has improved knee control no lag with straight leg raise.  Improving strength, challenged with single leg sit to stands . Continue as tolerated to maximize to tolerance.     Steph Is progressing well towards her goals.   Pt prognosis is Good.     Pt will continue to benefit from skilled outpatient physical therapy to address the deficits listed in the problem list box on initial evaluation, provide pt/family education and to maximize pt's level of independence in the home and community environment.     Pt's spiritual, cultural and educational needs considered and pt agreeable to plan of care and goals.     Anticipated barriers to physical therapy: none    Goals:  Short Term Goals: (4 weeks)  1. Pt will be independent with HEP in order to supplement patient in improving functional mobility. - progressing, not met  2. Pt will improve (R) knee AROM to at least 0-120 in order to improve gait. - progressing, not met  3. Pt will improve quad activation and strength to perform a SLR with no lag for 10 reps - progressing, not met  4. Pt will improve CKC quad control to facilitate normal mid stance and walking - progressing, not met     Long Term Goals: (8 weeks)  1. Pt will be independent with updated HEP supplement PT in improving functional mobility. - progressing, not met  2. Pt will improve report no difficulty or pain with ADL's- progressing, not met  3. Pt will improve FOTO knee survey score to </= predicted % limited in order to demo improved functional mobility. - progressing, not met  4. Pt will perform 5x STS to <13s  in order to demo improved ability to perform transfers  efficiently. - progressing, not met     Plan   Plan of care Certification: 12/20/2023 to 2/16/2024.       Xiao Felix, PTA

## 2024-01-19 ENCOUNTER — CLINICAL SUPPORT (OUTPATIENT)
Dept: REHABILITATION | Facility: HOSPITAL | Age: 54
End: 2024-01-19
Payer: COMMERCIAL

## 2024-01-19 DIAGNOSIS — R29.898 DECREASED STRENGTH OF LOWER EXTREMITY: ICD-10-CM

## 2024-01-19 DIAGNOSIS — M25.661 DECREASED RANGE OF MOTION (ROM) OF RIGHT KNEE: Primary | ICD-10-CM

## 2024-01-19 PROCEDURE — 97112 NEUROMUSCULAR REEDUCATION: CPT | Mod: PN

## 2024-01-19 PROCEDURE — 97530 THERAPEUTIC ACTIVITIES: CPT | Mod: PN

## 2024-01-19 PROCEDURE — 97110 THERAPEUTIC EXERCISES: CPT | Mod: PN

## 2024-01-19 NOTE — PROGRESS NOTES
Physical Therapy Treatment Note     Name: Steph Quigley  Clinic Number: [id number]    Therapy Diagnosis:   Encounter Diagnoses   Name Primary?    Decreased range of motion (ROM) of right knee Yes    Decreased strength of lower extremity      Physician: Zoran Hardwick MD    Visit Date: 1/19/2024    Physician Orders: PT Eval and Treat   Medical Diagnosis from Referral: Z96.651 (ICD-10-CM) - Presence of right artificial knee joint   Evaluation Date: 12/20/2023  Authorization Period Expiration: 12/31/2023  Plan of Care Expiration: 2/16/2024  Visit # / Visits authorized: 6 / 10     Time In: 130 pm  Time Out: 225pm  Total Appointment Time (timed & untimed codes): 55 minutes     Precautions: Standard     Surgery Date: 12/4/2023   Procedure(s) (LRB):  ARTHROPLASTY, KNEE, TOTAL (Right) ([facility])    Subjective     Pt reports: she is feeling better some discomfort still in her knee  She was compliant with home exercise program.  Response to previous treatment: no issues stated   Functional change: ongoing    Pain: 3/10  Location: right knee      Objective     ROM: +2-0-110 degrees  Quad set: fair +  SLR: able without lag    Steph received therapeutic exercises to develop strength, endurance, ROM, and flexibility for 15 minutes including:    Heel prop x 4 minutes 3#  Supine SLR 3# 3 x 10   DL Bridge 3 x 10     Steph received the following neuromuscular re-education to promote sense, kinesthesia, and proprioception for 20 minutes, including:    SL Hip Abd 3 x 10  cueing for pelvic control  Seated LAQ 3 x 10; 3sec eccentric 10#    Steph received the following manual therapy techniques: Joint mobilizations were applied to the: R knee for 0 minutes, including:    Tibiofemoral PAs; grade 3   Superior/inferior patella glides; grade 3     Steph participated in dynamic functional therapeutic activities to improve functional performance for 23 minutes, including:    DL Shuttle 87# 3 x 10   SL Shuttle 50# 3 x 8   SL Sit to  stands on 24 inch step + foam pad 3 x 10; 1 UE support with SBA  Fwd/Bwd unloaded Sled Push 3 x 10 yards   6 inch Step Ups 3 x 8     Home Exercises Provided and Patient Education Provided     Education provided:   - HEP    Written Home Exercises Provided: yes.  Exercises were reviewed and Steph was able to demonstrate them prior to the end of the session.  Steph demonstrated good  understanding of the education provided.     See EMR under Patient Instructions for exercises provided prior visit.    Assessment     Patient has improving strength, no pain with knee flexion and overpressure Her Range of Motion is improving. She can still improve her right Lower Extremity strength . Continue as tolerated to maximize to tolerance.     Steph Is progressing well towards her goals.   Pt prognosis is Good.     Pt will continue to benefit from skilled outpatient physical therapy to address the deficits listed in the problem list box on initial evaluation, provide pt/family education and to maximize pt's level of independence in the home and community environment.     Pt's spiritual, cultural and educational needs considered and pt agreeable to plan of care and goals.     Anticipated barriers to physical therapy: none    Goals:  Short Term Goals: (4 weeks)  1. Pt will be independent with HEP in order to supplement patient in improving functional mobility. - progressing, not met  2. Pt will improve (R) knee AROM to at least 0-120 in order to improve gait. - progressing, not met  3. Pt will improve quad activation and strength to perform a SLR with no lag for 10 reps - progressing, not met  4. Pt will improve CKC quad control to facilitate normal mid stance and walking - progressing, not met     Long Term Goals: (8 weeks)  1. Pt will be independent with updated HEP supplement PT in improving functional mobility. - progressing, not met  2. Pt will improve report no difficulty or pain with ADL's- progressing, not met  3. Pt will  improve FOTO knee survey score to </= predicted % limited in order to demo improved functional mobility. - progressing, not met  4. Pt will perform 5x STS to <13s  in order to demo improved ability to perform transfers efficiently. - progressing, not met     Plan   Plan of care Certification: 12/20/2023 to 2/16/2024.       Sai Laird, PT

## 2024-02-02 ENCOUNTER — CLINICAL SUPPORT (OUTPATIENT)
Dept: REHABILITATION | Facility: HOSPITAL | Age: 54
End: 2024-02-02
Payer: COMMERCIAL

## 2024-02-02 DIAGNOSIS — R29.898 DECREASED STRENGTH OF LOWER EXTREMITY: ICD-10-CM

## 2024-02-02 DIAGNOSIS — M25.661 DECREASED RANGE OF MOTION (ROM) OF RIGHT KNEE: Primary | ICD-10-CM

## 2024-02-02 PROCEDURE — 97110 THERAPEUTIC EXERCISES: CPT | Mod: PN

## 2024-02-02 PROCEDURE — 97112 NEUROMUSCULAR REEDUCATION: CPT | Mod: PN

## 2024-02-02 NOTE — PROGRESS NOTES
Physical Therapy Discharge Summary     Name: Steph Quigley  [facility] Number: [id number]    Therapy Diagnosis:   Encounter Diagnoses   Name Primary?    Decreased range of motion (ROM) of right knee Yes    Decreased strength of lower extremity        Physician: Zoran Hardwick MD    Visit Date: 2024    Physician Orders: PT Eval and Treat   Medical Diagnosis from Referral: Z96.651 (ICD-10-CM) - Presence of right artificial knee joint   Evaluation Date: 2023  Authorization Period Expiration: 2023  Plan of Care Expiration: 2024  Visit # / Visits authorized: 7 / 10     Time In: 132 pm  Time Out: 210 pm  Total Appointment Time (timed & untimed codes): 38 minutes     Precautions: Standard     Surgery Date: 2023   Procedure(s) (LRB):  ARTHROPLASTY, KNEE, TOTAL (Right) ([facility])    Subjective     Pt reports: she is really good. Wants today to be her last visit. She states she is not having any trouble with anything.     She was compliant with home exercise program.  Response to previous treatment: no issues stated   Functional change: ongoing    Pain: 0/10  Location: right knee      Objective     ROM: +2-0-115 degrees  Quad set: good  SLR: able without lag  5xSTS: 11.5s with no UE support    Steph received therapeutic exercises to develop strength, endurance, ROM, and flexibility for 26 minutes including:    Assessment  NuStep 7 minutes; level 3   Supine SLR 3# 3 x 10   DL Bridge 3 x 10   SL Shuttle 50# 3 x 10   Education - HEP     Steph participated in dynamic functional therapeutic activities to improve functional performance for 12 minutes, includin inch Step Ups 3 x 8   Wall Squats 3 x 8    Home Exercises Provided and Patient Education Provided     Education provided:   - HEP    Written Home Exercises Provided: yes.  Exercises were reviewed and Steph was able to demonstrate them prior to the end of the session.  Steph demonstrated good  understanding of the education provided.      See EMR under Patient Instructions for exercises provided prior visit.    Assessment     Patient reports she is back to PLOF with no functional deficits or pain. She is requesting d/c from PT. I am agreeable. Majority of her goals were met besides FOTO (5 points off). HEP reviewed with patient demonstrating good understanding.       Goals:  Short Term Goals: (4 weeks)  1. Pt will be independent with HEP in order to supplement patient in improving functional mobility. - met  2. Pt will improve (R) knee AROM to at least 0-120 in order to improve gait. - met  3. Pt will improve quad activation and strength to perform a SLR with no lag for 10 reps - met  4. Pt will improve CKC quad control to facilitate normal mid stance and walking - met     Long Term Goals: (8 weeks)  1. Pt will be independent with updated HEP supplement PT in improving functional mobility. -  met  2. Pt will improve report no difficulty or pain with ADL's- met  3. Pt will improve FOTO knee survey score to </= predicted % limited in order to demo improved functional mobility. - not met; 5 point off  4. Pt will perform 5x STS to <13s  in order to demo improved ability to perform transfers efficiently. -met     Plan   D/c    Marko Frost, PT

## 2024-05-22 DIAGNOSIS — Z12.31 ENCOUNTER FOR SCREENING MAMMOGRAM FOR MALIGNANT NEOPLASM OF BREAST: Primary | ICD-10-CM

## 2024-06-05 ENCOUNTER — HOSPITAL ENCOUNTER (OUTPATIENT)
Dept: RADIOLOGY | Facility: HOSPITAL | Age: 54
Discharge: HOME | End: 2024-06-05
Attending: NURSE PRACTITIONER
Payer: COMMERCIAL

## 2024-06-05 VITALS — BODY MASS INDEX: 38.64 KG/M2 | HEIGHT: 65 IN | WEIGHT: 231.94 LBS

## 2024-06-05 DIAGNOSIS — Z12.31 ENCOUNTER FOR SCREENING MAMMOGRAM FOR MALIGNANT NEOPLASM OF BREAST: ICD-10-CM

## 2024-06-05 PROCEDURE — 77067 SCR MAMMO BI INCL CAD: CPT | Mod: TC,PO

## 2024-06-05 PROCEDURE — 77067 SCR MAMMO BI INCL CAD: CPT | Mod: 26,,, | Performed by: RADIOLOGY

## 2024-06-05 PROCEDURE — 77063 BREAST TOMOSYNTHESIS BI: CPT | Mod: 26,,, | Performed by: RADIOLOGY

## 2024-07-12 ENCOUNTER — OFFICE VISIT (OUTPATIENT)
Dept: URGENT CARE | Facility: CLINIC | Age: 54
End: 2024-07-12
Payer: COMMERCIAL

## 2024-07-12 VITALS
BODY MASS INDEX: 38.49 KG/M2 | HEART RATE: 81 BPM | TEMPERATURE: 98 F | RESPIRATION RATE: 16 BRPM | SYSTOLIC BLOOD PRESSURE: 133 MMHG | HEIGHT: 65 IN | DIASTOLIC BLOOD PRESSURE: 76 MMHG | OXYGEN SATURATION: 99 % | WEIGHT: 231 LBS

## 2024-07-12 DIAGNOSIS — S80.01XA CONTUSION OF RIGHT KNEE, INITIAL ENCOUNTER: ICD-10-CM

## 2024-07-12 DIAGNOSIS — Z96.651 PRESENCE OF RIGHT ARTIFICIAL KNEE JOINT: ICD-10-CM

## 2024-07-12 DIAGNOSIS — S80.02XA CONTUSION OF LEFT KNEE, INITIAL ENCOUNTER: ICD-10-CM

## 2024-07-12 DIAGNOSIS — M25.512 ACUTE PAIN OF LEFT SHOULDER: ICD-10-CM

## 2024-07-12 DIAGNOSIS — S46.009A ROTATOR CUFF INJURY, INITIAL ENCOUNTER: Primary | ICD-10-CM

## 2024-07-12 PROBLEM — F41.1 GENERALIZED ANXIETY DISORDER: Status: ACTIVE | Noted: 2023-12-05

## 2024-07-12 PROBLEM — Z79.82 LONG TERM (CURRENT) USE OF ASPIRIN: Status: ACTIVE | Noted: 2023-12-05

## 2024-07-12 PROBLEM — E66.9 OBESITY: Status: ACTIVE | Noted: 2020-02-19

## 2024-07-12 PROBLEM — N20.0 NEPHROLITHIASIS: Status: ACTIVE | Noted: 2020-02-04

## 2024-07-12 PROBLEM — F32.A DEPRESSION, UNSPECIFIED: Status: ACTIVE | Noted: 2023-12-05

## 2024-07-12 PROCEDURE — 73030 X-RAY EXAM OF SHOULDER: CPT | Mod: LT,S$GLB,, | Performed by: RADIOLOGY

## 2024-07-12 RX ORDER — DICLOFENAC SODIUM 10 MG/G
2 GEL TOPICAL 4 TIMES DAILY
Qty: 100 G | Refills: 1 | Status: SHIPPED | OUTPATIENT
Start: 2024-07-12

## 2024-07-12 RX ORDER — ACETAMINOPHEN 325 MG/1
650 TABLET ORAL
Status: COMPLETED | OUTPATIENT
Start: 2024-07-12 | End: 2024-07-12

## 2024-07-12 RX ADMIN — ACETAMINOPHEN 650 MG: 325 TABLET ORAL at 12:07

## 2024-07-12 NOTE — PROGRESS NOTES
"Subjective:      Patient ID: Steph Quigley is a 54 y.o. female.    Vitals:  height is 5' 5" (1.651 m) and weight is 104.8 kg (231 lb). Her oral temperature is 97.9 °F (36.6 °C). Her blood pressure is 133/76 and her pulse is 81. Her respiration is 16 and oxygen saturation is 99%.     Chief Complaint: Fall    Pt is coming in today for a fall an hour ago. She injured her left shoulder and knee. Pt stated that she tripped over a chair at home. She took 2 aleve tablets. However, it did not help.     Fall  The accident occurred 1 to 3 hours ago. Fall occurred: pt tripped over a chair. The point of impact was the left shoulder and left knee. The pain is present in the left shoulder, left knee and neck. The pain is at a severity of 9/10. The pain is moderate. She has tried NSAID for the symptoms. The treatment provided no relief.     ROS   Objective:     Physical Exam   Constitutional: She is oriented to person, place, and time. She appears distressed (pt crying softly due to left shoulder pain).   HENT:   Head: Normocephalic and atraumatic.   Ears:   Right Ear: External ear normal.   Left Ear: External ear normal.   Nose: Nose normal.   Eyes: Pupils are equal, round, and reactive to light. Extraocular movement intact   Cardiovascular: Normal rate and regular rhythm.   Pulmonary/Chest: Effort normal and breath sounds normal.   Musculoskeletal:         General: Tenderness (acute ttp of left shoulder diffusely. No obvious erythema or edema.) present. No swelling or deformity.      Cervical back: She exhibits tenderness (left paravertebral muscles are ttp and feel tight).      Comments: Decreased ROM of left shoulder in all fields and unable to abduct greater than 90 degrees. Positive drop arm test and empty can testing   Neurological: no focal deficit. She is alert and oriented to person, place, and time.   Skin: Skin is warm and dry.   Psychiatric: Her behavior is normal. Judgment and thought content normal.   Nursing note " and vitals reviewed.      Assessment:     1. Rotator cuff injury, initial encounter    2. Acute pain of left shoulder    3. Contusion of left knee, initial encounter    4. Contusion of right knee, initial encounter    5. Presence of right artificial knee joint        Plan:       Rotator cuff injury, initial encounter  -     SLING FOR HOME USE  -     Ambulatory referral/consult to Orthopedics    Acute pain of left shoulder  -     X-Ray Shoulder 2 or More Views Left; Future; Expected date: 07/12/2024  -     acetaminophen tablet 650 mg  -     SLING FOR HOME USE  -     Ambulatory referral/consult to Orthopedics    Contusion of left knee, initial encounter    Contusion of right knee, initial encounter    Presence of right artificial knee joint      Pt or guardian provided educational materials and instructions regarding their visit diagnosis.

## 2024-11-13 DIAGNOSIS — R05.9 COUGH: Primary | ICD-10-CM

## 2024-11-18 ENCOUNTER — HOSPITAL ENCOUNTER (OUTPATIENT)
Dept: RADIOLOGY | Facility: HOSPITAL | Age: 54
Discharge: HOME | End: 2024-11-18
Attending: NURSE PRACTITIONER
Payer: COMMERCIAL

## 2024-11-18 DIAGNOSIS — R05.9 COUGH: ICD-10-CM

## 2024-11-18 PROCEDURE — 71046 X-RAY EXAM CHEST 2 VIEWS: CPT | Mod: TC,PO

## 2024-11-18 PROCEDURE — 71046 X-RAY EXAM CHEST 2 VIEWS: CPT | Mod: 26,,, | Performed by: RADIOLOGY

## 2024-11-27 ENCOUNTER — ANESTHESIA EVENT (OUTPATIENT)
Dept: SURGERY | Facility: OTHER | Age: 54
End: 2024-11-27
Payer: COMMERCIAL

## 2024-12-09 ENCOUNTER — HOSPITAL ENCOUNTER (OUTPATIENT)
Dept: PREADMISSION TESTING | Facility: OTHER | Age: 54
Discharge: HOME | End: 2024-12-09
Attending: ORTHOPAEDIC SURGERY
Payer: COMMERCIAL

## 2024-12-09 VITALS
OXYGEN SATURATION: 99 % | BODY MASS INDEX: 39.15 KG/M2 | HEIGHT: 65 IN | TEMPERATURE: 98 F | HEART RATE: 87 BPM | RESPIRATION RATE: 19 BRPM | WEIGHT: 235 LBS | DIASTOLIC BLOOD PRESSURE: 90 MMHG | SYSTOLIC BLOOD PRESSURE: 160 MMHG

## 2024-12-09 DIAGNOSIS — Z01.818 PREOP TESTING: Primary | ICD-10-CM

## 2024-12-09 LAB
BILIRUB UR QL STRIP: NEGATIVE
CLARITY UR: CLEAR
COLOR UR: YELLOW
GLUCOSE UR QL STRIP: NEGATIVE
HGB UR QL STRIP: ABNORMAL
KETONES UR QL STRIP: NEGATIVE
LEUKOCYTE ESTERASE UR QL STRIP: NEGATIVE
NITRITE UR QL STRIP: NEGATIVE
PH UR STRIP: 6 [PH] (ref 5–8)
PROT UR QL STRIP: NEGATIVE
SP GR UR STRIP: 1.02 (ref 1–1.03)
URN SPEC COLLECT METH UR: ABNORMAL
UROBILINOGEN UR STRIP-ACNC: NEGATIVE EU/DL

## 2024-12-09 PROCEDURE — 81003 URINALYSIS AUTO W/O SCOPE: CPT | Performed by: ORTHOPAEDIC SURGERY

## 2024-12-09 RX ORDER — LIDOCAINE HYDROCHLORIDE 10 MG/ML
0.5 INJECTION, SOLUTION EPIDURAL; INFILTRATION; INTRACAUDAL; PERINEURAL ONCE
OUTPATIENT
Start: 2024-12-09 | End: 2024-12-09

## 2024-12-09 RX ORDER — MELOXICAM 15 MG/1
15 TABLET ORAL
COMMUNITY
Start: 2024-07-11

## 2024-12-09 RX ORDER — FAMOTIDINE 20 MG/1
20 TABLET, FILM COATED ORAL
OUTPATIENT
Start: 2024-12-09 | End: 2024-12-09

## 2024-12-09 RX ORDER — ACETAMINOPHEN 325 MG/1
975 TABLET ORAL
OUTPATIENT
Start: 2024-12-09 | End: 2024-12-09

## 2024-12-09 RX ORDER — SODIUM CHLORIDE, SODIUM LACTATE, POTASSIUM CHLORIDE, CALCIUM CHLORIDE 600; 310; 30; 20 MG/100ML; MG/100ML; MG/100ML; MG/100ML
INJECTION, SOLUTION INTRAVENOUS CONTINUOUS
OUTPATIENT
Start: 2024-12-09

## 2024-12-09 RX ORDER — MUPIROCIN 20 MG/G
OINTMENT TOPICAL
COMMUNITY
Start: 2024-11-20

## 2024-12-09 NOTE — ANESTHESIA PREPROCEDURE EVALUATION
12/09/2024  Steph Quigley is a 54 y.o., female.      Pre-op Assessment    I have reviewed the Patient Summary Reports.     I have reviewed the Nursing Notes. I have reviewed the NPO Status.   I have reviewed the Medications.     Review of Systems  Anesthesia Hx:  No problems with previous Anesthesia                Social:  Smoker       Hematology/Oncology:    Oncology Normal                                   EENT/Dental:  EENT/Dental Normal           Cardiovascular:  Exercise tolerance: good                                             Pulmonary:  Pulmonary Normal                       Renal/:  Chronic Renal Disease renal calculi               Hepatic/GI:  Hepatic/GI Normal                    Musculoskeletal:  Arthritis               Endocrine:        Obesity / BMI > 30  Psych:  Psychiatric History anxiety depression                Physical Exam  General: Cooperative and Alert    Airway:  Mallampati: II   Mouth Opening: Normal  TM Distance: Normal  Tongue: Normal  Neck ROM: Normal ROM    Dental:  Intact        Anesthesia Plan  Type of Anesthesia, risks & benefits discussed:    Anesthesia Type: Spinal  Intra-op Monitoring Plan: Standard ASA Monitors  Post Op Pain Control Plan: multimodal analgesia  Induction:  IV  Informed Consent: Informed consent signed with the Patient and all parties understand the risks and agree with anesthesia plan.  All questions answered.   ASA Score: 3  Anesthesia Plan Notes: Discussed spinal   labs in chart.  Saw her int med doc 3 weeks ago.  Pt has clearance on paper chart.    Ready For Surgery From Anesthesia Perspective.     .

## 2024-12-09 NOTE — DISCHARGE INSTRUCTIONS
Information to Prepare you for your Surgery    PRE-ADMIT TESTING -  [phone]   -Yazmin  [address] [place] [facility] [address]  [facility]      Your surgery has been scheduled at [facility]. We are pleased to have the opportunity to serve you. For Further Information please call [phone].    On the day of surgery please report to the Information Desk on the 1st floor.    CONTACT YOUR PHYSICIAN'S OFFICE THE DAY PRIOR TO YOUR SURGERY TO OBTAIN YOUR ARRIVAL TIME.     The evening before surgery do not eat anything after 9 p.m. ( this includes hard candy, chewing gum and mints).  You may only have GATORADE, POWERADE AND WATER  from 9 p.m. until you leave your home.    AT LEAST 12-24 OUNCES BEFORE YOU LEAVE YOU HOUSE IN THE MORNING TO COME TO SURGERY.    DO NOT DRINK ANY LIQUIDS ON THE WAY TO THE HOSPITAL.      Why does your anesthesiologist allow you to drink Gatorade/Powerade before surgery?  Gatorade/Powerade helps to increase your comfort before surgery and to decrease your nausea after surgery. The carbohydrates in Gatorade/Powerade help reduce your body's stress response to surgery.      Outpatient Surgery- May allow 2 adult (18 and older) Support Persons (1 being the designated ) for all surgical/procedural patients. A breastfeeding mother will be allowed her infant and 2 adult Support Persons. No one under the age of 18 will be allowed in the building. No swapping out of visitors in the [facility].      SPECIAL MEDICATION INSTRUCTIONS: TAKE medications checked off by the Anesthesiologist on your Medication List.        Surgery Patients:    If you take ASPIRIN - Your PHYSICIAN/SURGEON will need to inform you IF/OR when you need to stop taking aspirin prior to your surgery.     The week prior to surgery do not ot take any medications containing IBUPROFEN or NSAIDS ( Advil, Motrin, Goodys, BC, Aleve, Naproxen,   Ketorolac, Meloxicam, Mobic, Toradol,etc) If you are not sure if you should take a medicine please call your surgeon's office.  Ok to take Tylenol    Do Not Wear any make-up (especially eye make-up) to surgery. Please remove any false eyelashes or eyelash extensions. If you arrive the day of surgery with makeup/eyelashes on you will be required to remove prior to surgery. (There is a risk of corneal abrasions if eye makeup/eyelash extensions are not removed)      Leave all valuables at home.   Do Not wear any jewelry or watches, including any metal in body piercings. Jewelry must be removed prior to coming to the hospital.  There is a possibility that rings that are unable to be removed may be cut off if they are on the surgical extremity.    Please remove all hair extensions, wigs, clips and any other metal accessories/ ornaments from your hair.  These items may pose a flammable/fire risk in Surgery and must be removed.    Do not shave your surgical area at least 5 days prior to your surgery. The surgical prep will be performed at the hospital according to Infection Control regulations.    Contact Lens must be removed before surgery. Either do not wear the contact lens or bring a case and solution for storage.  Please bring a container for eyeglasses or dentures as required.  Bring any paperwork your physician has provided, such as consent forms,  history and physicals, doctor's orders, etc.   Bring comfortable clothes that are loose fitting to wear upon discharge. Take into consideration the type of surgery being performed.  Maintain your diet as advised per your physician the day prior to surgery.      Adequate rest the night before surgery is advised.   Park in the Parking lot behind the hospital or in the [employer] [facility] across the street from the parking lot. Parking is complimentary.  If you will be discharged the same day as your procedure, please arrange for a responsible adult to drive you home or  to accompany you if traveling by taxi.   YOU WILL NOT BE PERMITTED TO DRIVE OR TO LEAVE THE HOSPITAL ALONE AFTER SURGERY.   If you are being discharged the same day, it is strongly recommended that you arrange for someone to remain with you for the first 24 hrs following your surgery.    The Surgeon will speak to your family/visitor after your surgery regarding the outcome of your surgery and post op care.  The Surgeon may speak to you after your surgery, but there is a possibility you may not remember the details.  Please check with your family members regarding the conversation with the Surgeon.    We strongly recommend whoever is bringing you home be present for discharge instructions.  This will ensure a thorough understanding for your post op home care.    If the patient has fever, cough, or signs/symptoms of Flu or Covid please do not come in for your surgery. Contact your surgeon and your primary care physician for further instructions.           Thank you for your cooperation.  The Staff of [facility].            Bathing Instructions with Hibiclens or Dial Soap    Shower the evening before and morning of your procedure with Chlorhexidine (Hibiclens)  do not use Chlorhexidine on your face or genitals. Do not get in your eyes.  Wash your face with water and your regular face wash/soap  Use your regular shampoo  Apply Chlorhexidine (Hibiclens) directly on your skin or on a wet washcloth and wash gently. When showering: Move away from the shower stream when applying Chlorhexidine (Hibiclens) to avoid rinsing off too soon.  Rinse thoroughly with warm water  Do not dilute Chlorhexidine (Hibiclens)   Dry off as usual, do not use any deodorant, powder, body lotions, perfume, after shave or cologne.     Thanks ,   Yazmin

## 2024-12-16 ENCOUNTER — ANESTHESIA (OUTPATIENT)
Dept: SURGERY | Facility: OTHER | Age: 54
End: 2024-12-16
Payer: COMMERCIAL

## 2024-12-16 ENCOUNTER — HOSPITAL ENCOUNTER (OUTPATIENT)
Facility: OTHER | Age: 54
Discharge: HOME HEALTH | End: 2024-12-16
Attending: ORTHOPAEDIC SURGERY | Admitting: ORTHOPAEDIC SURGERY
Payer: COMMERCIAL

## 2024-12-16 DIAGNOSIS — M17.12 UNILATERAL PRIMARY OSTEOARTHRITIS, LEFT KNEE: Primary | ICD-10-CM

## 2024-12-16 PROCEDURE — 25000003 PHARM REV CODE 250: Performed by: ORTHOPAEDIC SURGERY

## 2024-12-16 PROCEDURE — 25000003 PHARM REV CODE 250: Performed by: ANESTHESIOLOGY

## 2024-12-16 PROCEDURE — 71000016 HC POSTOP RECOV ADDL HR: Performed by: ORTHOPAEDIC SURGERY

## 2024-12-16 PROCEDURE — 37000009 HC ANESTHESIA EA ADD 15 MINS: Performed by: ORTHOPAEDIC SURGERY

## 2024-12-16 PROCEDURE — 37000008 HC ANESTHESIA 1ST 15 MINUTES: Performed by: ORTHOPAEDIC SURGERY

## 2024-12-16 PROCEDURE — 63600175 PHARM REV CODE 636 W HCPCS

## 2024-12-16 PROCEDURE — 97530 THERAPEUTIC ACTIVITIES: CPT

## 2024-12-16 PROCEDURE — 63600175 PHARM REV CODE 636 W HCPCS: Performed by: ANESTHESIOLOGY

## 2024-12-16 PROCEDURE — 71000015 HC POSTOP RECOV 1ST HR: Performed by: ORTHOPAEDIC SURGERY

## 2024-12-16 PROCEDURE — 27201423 OPTIME MED/SURG SUP & DEVICES STERILE SUPPLY: Performed by: ORTHOPAEDIC SURGERY

## 2024-12-16 PROCEDURE — 63600175 PHARM REV CODE 636 W HCPCS: Performed by: ORTHOPAEDIC SURGERY

## 2024-12-16 PROCEDURE — C9290 INJ, BUPIVACAINE LIPOSOME: HCPCS | Performed by: ORTHOPAEDIC SURGERY

## 2024-12-16 PROCEDURE — 97161 PT EVAL LOW COMPLEX 20 MIN: CPT

## 2024-12-16 PROCEDURE — D9220A PRA ANESTHESIA: Mod: ANES,,, | Performed by: ANESTHESIOLOGY

## 2024-12-16 PROCEDURE — 25000242 PHARM REV CODE 250 ALT 637 W/ HCPCS: Performed by: ANESTHESIOLOGY

## 2024-12-16 PROCEDURE — 63600175 PHARM REV CODE 636 W HCPCS: Performed by: STUDENT IN AN ORGANIZED HEALTH CARE EDUCATION/TRAINING PROGRAM

## 2024-12-16 PROCEDURE — C1713 ANCHOR/SCREW BN/BN,TIS/BN: HCPCS | Performed by: ORTHOPAEDIC SURGERY

## 2024-12-16 PROCEDURE — 71000033 HC RECOVERY, INTIAL HOUR: Performed by: ORTHOPAEDIC SURGERY

## 2024-12-16 PROCEDURE — D9220A PRA ANESTHESIA: Mod: CRNA,,, | Performed by: STUDENT IN AN ORGANIZED HEALTH CARE EDUCATION/TRAINING PROGRAM

## 2024-12-16 PROCEDURE — 36000710: Performed by: ORTHOPAEDIC SURGERY

## 2024-12-16 PROCEDURE — 25000003 PHARM REV CODE 250

## 2024-12-16 PROCEDURE — C1776 JOINT DEVICE (IMPLANTABLE): HCPCS | Performed by: ORTHOPAEDIC SURGERY

## 2024-12-16 PROCEDURE — 36000711: Performed by: ORTHOPAEDIC SURGERY

## 2024-12-16 PROCEDURE — 25000003 PHARM REV CODE 250: Performed by: STUDENT IN AN ORGANIZED HEALTH CARE EDUCATION/TRAINING PROGRAM

## 2024-12-16 DEVICE — PSN TIB STM 5 DEG SZ D L: Type: IMPLANTABLE DEVICE | Site: KNEE | Status: FUNCTIONAL

## 2024-12-16 DEVICE — COMP FEM CRUC COCR SZ 6 LEFT: Type: IMPLANTABLE DEVICE | Site: KNEE | Status: FUNCTIONAL

## 2024-12-16 DEVICE — CEMENT REFOBACIN BCR 1X40: Type: IMPLANTABLE DEVICE | Site: KNEE | Status: FUNCTIONAL

## 2024-12-16 DEVICE — PATELLA NEXGEN ALL-POLY 35MM D: Type: IMPLANTABLE DEVICE | Site: KNEE | Status: FUNCTIONAL

## 2024-12-16 DEVICE — IMPLANTABLE DEVICE: Type: IMPLANTABLE DEVICE | Site: KNEE | Status: FUNCTIONAL

## 2024-12-16 RX ORDER — PROPOFOL 10 MG/ML
INJECTION, EMULSION INTRAVENOUS CONTINUOUS PRN
Status: DISCONTINUED | OUTPATIENT
Start: 2024-12-16 | End: 2024-12-16

## 2024-12-16 RX ORDER — CYCLOBENZAPRINE HCL 5 MG
10 TABLET ORAL ONCE
Status: COMPLETED | OUTPATIENT
Start: 2024-12-16 | End: 2024-12-16

## 2024-12-16 RX ORDER — ONDANSETRON HYDROCHLORIDE 2 MG/ML
INJECTION, SOLUTION INTRAVENOUS
Status: DISCONTINUED | OUTPATIENT
Start: 2024-12-16 | End: 2024-12-16

## 2024-12-16 RX ORDER — IPRATROPIUM BROMIDE AND ALBUTEROL SULFATE 2.5; .5 MG/3ML; MG/3ML
3 SOLUTION RESPIRATORY (INHALATION)
Status: COMPLETED | OUTPATIENT
Start: 2024-12-16 | End: 2024-12-16

## 2024-12-16 RX ORDER — ROPIVACAINE HYDROCHLORIDE 5 MG/ML
INJECTION, SOLUTION EPIDURAL; INFILTRATION; PERINEURAL
Status: COMPLETED | OUTPATIENT
Start: 2024-12-16 | End: 2024-12-16

## 2024-12-16 RX ORDER — OXYCODONE HYDROCHLORIDE 5 MG/1
10 TABLET ORAL EVERY 4 HOURS PRN
Status: DISCONTINUED | OUTPATIENT
Start: 2024-12-16 | End: 2024-12-16 | Stop reason: HOSPADM

## 2024-12-16 RX ORDER — PHENYLEPHRINE HYDROCHLORIDE 10 MG/ML
INJECTION INTRAVENOUS
Status: DISCONTINUED | OUTPATIENT
Start: 2024-12-16 | End: 2024-12-16

## 2024-12-16 RX ORDER — ONDANSETRON 8 MG/1
8 TABLET, ORALLY DISINTEGRATING ORAL EVERY 8 HOURS PRN
Status: DISCONTINUED | OUTPATIENT
Start: 2024-12-16 | End: 2024-12-16 | Stop reason: HOSPADM

## 2024-12-16 RX ORDER — CYCLOBENZAPRINE HCL 10 MG
10 TABLET ORAL 3 TIMES DAILY PRN
Qty: 20 TABLET | Refills: 0 | Status: SHIPPED | OUTPATIENT
Start: 2024-12-16 | End: 2024-12-26

## 2024-12-16 RX ORDER — SODIUM CHLORIDE, SODIUM LACTATE, POTASSIUM CHLORIDE, CALCIUM CHLORIDE 600; 310; 30; 20 MG/100ML; MG/100ML; MG/100ML; MG/100ML
INJECTION, SOLUTION INTRAVENOUS CONTINUOUS
Status: DISCONTINUED | OUTPATIENT
Start: 2024-12-16 | End: 2024-12-16 | Stop reason: HOSPADM

## 2024-12-16 RX ORDER — NAPROXEN SODIUM 220 MG/1
81 TABLET, FILM COATED ORAL 2 TIMES DAILY
Qty: 60 TABLET | Refills: 0 | Status: SHIPPED | OUTPATIENT
Start: 2024-12-16

## 2024-12-16 RX ORDER — CEFAZOLIN SODIUM 1 G/3ML
2 INJECTION, POWDER, FOR SOLUTION INTRAMUSCULAR; INTRAVENOUS
Status: COMPLETED | OUTPATIENT
Start: 2024-12-16 | End: 2024-12-16

## 2024-12-16 RX ORDER — MIDAZOLAM HYDROCHLORIDE 1 MG/ML
INJECTION INTRAMUSCULAR; INTRAVENOUS
Status: DISCONTINUED | OUTPATIENT
Start: 2024-12-16 | End: 2024-12-16

## 2024-12-16 RX ORDER — ACETAMINOPHEN 500 MG
1000 TABLET ORAL
Status: COMPLETED | OUTPATIENT
Start: 2024-12-16 | End: 2024-12-16

## 2024-12-16 RX ORDER — SODIUM CHLORIDE 0.9 % (FLUSH) 0.9 %
3 SYRINGE (ML) INJECTION
Status: DISCONTINUED | OUTPATIENT
Start: 2024-12-16 | End: 2024-12-16 | Stop reason: HOSPADM

## 2024-12-16 RX ORDER — PROCHLORPERAZINE EDISYLATE 5 MG/ML
5 INJECTION INTRAMUSCULAR; INTRAVENOUS EVERY 30 MIN PRN
Status: DISCONTINUED | OUTPATIENT
Start: 2024-12-16 | End: 2024-12-16 | Stop reason: HOSPADM

## 2024-12-16 RX ORDER — FAMOTIDINE 20 MG/1
20 TABLET, FILM COATED ORAL
Status: COMPLETED | OUTPATIENT
Start: 2024-12-16 | End: 2024-12-16

## 2024-12-16 RX ORDER — HYDROMORPHONE HYDROCHLORIDE 2 MG/ML
1 INJECTION, SOLUTION INTRAMUSCULAR; INTRAVENOUS; SUBCUTANEOUS ONCE
Status: COMPLETED | OUTPATIENT
Start: 2024-12-16 | End: 2024-12-16

## 2024-12-16 RX ORDER — BUPIVACAINE HCL/EPINEPHRINE 0.25-.0005
VIAL (ML) INJECTION
Status: DISCONTINUED | OUTPATIENT
Start: 2024-12-16 | End: 2024-12-16 | Stop reason: HOSPADM

## 2024-12-16 RX ORDER — LIDOCAINE HYDROCHLORIDE 20 MG/ML
INJECTION INTRAVENOUS
Status: DISCONTINUED | OUTPATIENT
Start: 2024-12-16 | End: 2024-12-16

## 2024-12-16 RX ORDER — SCOLOPAMINE TRANSDERMAL SYSTEM 1 MG/1
1 PATCH, EXTENDED RELEASE TRANSDERMAL
Status: DISCONTINUED | OUTPATIENT
Start: 2024-12-16 | End: 2024-12-16 | Stop reason: HOSPADM

## 2024-12-16 RX ORDER — OXYCODONE HYDROCHLORIDE 5 MG/1
5 TABLET ORAL EVERY 4 HOURS PRN
Status: DISCONTINUED | OUTPATIENT
Start: 2024-12-16 | End: 2024-12-16 | Stop reason: HOSPADM

## 2024-12-16 RX ORDER — LIDOCAINE HYDROCHLORIDE 10 MG/ML
0.5 INJECTION, SOLUTION EPIDURAL; INFILTRATION; INTRACAUDAL; PERINEURAL ONCE
Status: DISCONTINUED | OUTPATIENT
Start: 2024-12-16 | End: 2024-12-16 | Stop reason: HOSPADM

## 2024-12-16 RX ORDER — ACETAMINOPHEN 325 MG/1
650 TABLET ORAL EVERY 4 HOURS PRN
Status: DISCONTINUED | OUTPATIENT
Start: 2024-12-16 | End: 2024-12-16 | Stop reason: HOSPADM

## 2024-12-16 RX ORDER — SODIUM CHLORIDE, SODIUM LACTATE, POTASSIUM CHLORIDE, CALCIUM CHLORIDE 600; 310; 30; 20 MG/100ML; MG/100ML; MG/100ML; MG/100ML
INJECTION, SOLUTION INTRAVENOUS CONTINUOUS PRN
Status: DISCONTINUED | OUTPATIENT
Start: 2024-12-16 | End: 2024-12-16

## 2024-12-16 RX ORDER — OXYCODONE HYDROCHLORIDE 5 MG/1
5 TABLET ORAL
Status: DISCONTINUED | OUTPATIENT
Start: 2024-12-16 | End: 2024-12-16 | Stop reason: HOSPADM

## 2024-12-16 RX ORDER — SODIUM CHLORIDE 0.9 % (FLUSH) 0.9 %
3 SYRINGE (ML) INJECTION EVERY 6 HOURS PRN
Status: DISCONTINUED | OUTPATIENT
Start: 2024-12-16 | End: 2024-12-16 | Stop reason: HOSPADM

## 2024-12-16 RX ORDER — BUPIVACAINE 13.3 MG/ML
INJECTION, SUSPENSION, LIPOSOMAL INFILTRATION
Status: DISCONTINUED | OUTPATIENT
Start: 2024-12-16 | End: 2024-12-16 | Stop reason: HOSPADM

## 2024-12-16 RX ORDER — TRANEXAMIC ACID 100 MG/ML
INJECTION, SOLUTION INTRAVENOUS
Status: DISCONTINUED | OUTPATIENT
Start: 2024-12-16 | End: 2024-12-16

## 2024-12-16 RX ORDER — FENTANYL CITRATE 50 UG/ML
INJECTION, SOLUTION INTRAMUSCULAR; INTRAVENOUS
Status: DISCONTINUED | OUTPATIENT
Start: 2024-12-16 | End: 2024-12-16

## 2024-12-16 RX ORDER — MEPERIDINE HYDROCHLORIDE 25 MG/ML
12.5 INJECTION INTRAMUSCULAR; INTRAVENOUS; SUBCUTANEOUS ONCE AS NEEDED
Status: DISCONTINUED | OUTPATIENT
Start: 2024-12-16 | End: 2024-12-16 | Stop reason: HOSPADM

## 2024-12-16 RX ORDER — ONDANSETRON 8 MG/1
8 TABLET, ORALLY DISINTEGRATING ORAL EVERY 8 HOURS PRN
Qty: 20 TABLET | Refills: 1 | Status: SHIPPED | OUTPATIENT
Start: 2024-12-16

## 2024-12-16 RX ORDER — EPHEDRINE SULFATE 50 MG/ML
INJECTION, SOLUTION INTRAVENOUS
Status: DISCONTINUED | OUTPATIENT
Start: 2024-12-16 | End: 2024-12-16

## 2024-12-16 RX ORDER — METOCLOPRAMIDE HYDROCHLORIDE 5 MG/ML
5 INJECTION INTRAMUSCULAR; INTRAVENOUS EVERY 6 HOURS PRN
Status: DISCONTINUED | OUTPATIENT
Start: 2024-12-16 | End: 2024-12-16 | Stop reason: HOSPADM

## 2024-12-16 RX ORDER — OXYCODONE AND ACETAMINOPHEN 5; 325 MG/1; MG/1
TABLET ORAL
Qty: 60 TABLET | Refills: 0 | Status: SHIPPED | OUTPATIENT
Start: 2024-12-16

## 2024-12-16 RX ORDER — GLUCAGON 1 MG
1 KIT INJECTION
Status: DISCONTINUED | OUTPATIENT
Start: 2024-12-16 | End: 2024-12-16 | Stop reason: HOSPADM

## 2024-12-16 RX ORDER — HYDROMORPHONE HYDROCHLORIDE 2 MG/ML
0.4 INJECTION, SOLUTION INTRAMUSCULAR; INTRAVENOUS; SUBCUTANEOUS EVERY 5 MIN PRN
Status: DISCONTINUED | OUTPATIENT
Start: 2024-12-16 | End: 2024-12-16 | Stop reason: HOSPADM

## 2024-12-16 RX ADMIN — ONDANSETRON HYDROCHLORIDE 4 MG: 2 INJECTION INTRAMUSCULAR; INTRAVENOUS at 09:12

## 2024-12-16 RX ADMIN — CEFAZOLIN 2 G: 330 INJECTION, POWDER, FOR SOLUTION INTRAMUSCULAR; INTRAVENOUS at 04:12

## 2024-12-16 RX ADMIN — FENTANYL CITRATE 100 MCG: 50 INJECTION, SOLUTION INTRAMUSCULAR; INTRAVENOUS at 10:12

## 2024-12-16 RX ADMIN — EPHEDRINE SULFATE 10 MG: 50 INJECTION INTRAVENOUS at 10:12

## 2024-12-16 RX ADMIN — PHENYLEPHRINE HYDROCHLORIDE 100 MCG: 10 INJECTION INTRAVENOUS at 10:12

## 2024-12-16 RX ADMIN — CYCLOBENZAPRINE HYDROCHLORIDE 10 MG: 5 TABLET, FILM COATED ORAL at 02:12

## 2024-12-16 RX ADMIN — PROPOFOL 125 MCG/KG/MIN: 10 INJECTION, EMULSION INTRAVENOUS at 11:12

## 2024-12-16 RX ADMIN — VANCOMYCIN HYDROCHLORIDE 1500 MG: 1.5 INJECTION, POWDER, LYOPHILIZED, FOR SOLUTION INTRAVENOUS at 09:12

## 2024-12-16 RX ADMIN — CEFAZOLIN 2 G: 330 INJECTION, POWDER, FOR SOLUTION INTRAMUSCULAR; INTRAVENOUS at 10:12

## 2024-12-16 RX ADMIN — PROPOFOL 20 MCG/KG/MIN: 10 INJECTION, EMULSION INTRAVENOUS at 10:12

## 2024-12-16 RX ADMIN — SODIUM CHLORIDE, SODIUM LACTATE, POTASSIUM CHLORIDE, AND CALCIUM CHLORIDE: 600; 310; 30; 20 INJECTION, SOLUTION INTRAVENOUS at 10:12

## 2024-12-16 RX ADMIN — SCOPOLAMINE 1 PATCH: 1.5 PATCH, EXTENDED RELEASE TRANSDERMAL at 09:12

## 2024-12-16 RX ADMIN — TRANEXAMIC ACID 2000 MG: 100 INJECTION, SOLUTION INTRAVENOUS at 10:12

## 2024-12-16 RX ADMIN — ACETAMINOPHEN 1000 MG: 500 TABLET, FILM COATED ORAL at 09:12

## 2024-12-16 RX ADMIN — GLYCOPYRROLATE 0.2 MG: 0.2 INJECTION, SOLUTION INTRAMUSCULAR; INTRAVITREAL at 10:12

## 2024-12-16 RX ADMIN — EPHEDRINE SULFATE 10 MG: 50 INJECTION INTRAVENOUS at 11:12

## 2024-12-16 RX ADMIN — PHENYLEPHRINE HYDROCHLORIDE 200 MCG: 10 INJECTION INTRAVENOUS at 10:12

## 2024-12-16 RX ADMIN — MIDAZOLAM HYDROCHLORIDE 2 MG: 1 INJECTION, SOLUTION INTRAMUSCULAR; INTRAVENOUS at 10:12

## 2024-12-16 RX ADMIN — IPRATROPIUM BROMIDE AND ALBUTEROL SULFATE 3 ML: 2.5; .5 SOLUTION RESPIRATORY (INHALATION) at 09:12

## 2024-12-16 RX ADMIN — ROPIVACAINE HYDROCHLORIDE 3 ML: 5 INJECTION, SOLUTION EPIDURAL; INFILTRATION; PERINEURAL at 10:12

## 2024-12-16 RX ADMIN — LIDOCAINE HYDROCHLORIDE 50 MG: 20 INJECTION, SOLUTION INTRAVENOUS at 10:12

## 2024-12-16 RX ADMIN — HYDROMORPHONE HYDROCHLORIDE 1 MG: 2 INJECTION INTRAMUSCULAR; INTRAVENOUS; SUBCUTANEOUS at 03:12

## 2024-12-16 RX ADMIN — OXYCODONE 5 MG: 5 TABLET ORAL at 01:12

## 2024-12-16 RX ADMIN — FAMOTIDINE 20 MG: 20 TABLET, FILM COATED ORAL at 09:12

## 2024-12-16 RX ADMIN — OXYCODONE 5 MG: 5 TABLET ORAL at 11:12

## 2024-12-16 RX ADMIN — PHENYLEPHRINE HYDROCHLORIDE 100 MCG: 10 INJECTION INTRAVENOUS at 11:12

## 2024-12-16 RX ADMIN — PHENYLEPHRINE HYDROCHLORIDE 200 MCG: 10 INJECTION INTRAVENOUS at 11:12

## 2024-12-16 NOTE — PLAN OF CARE
Problem: Physical Therapy  Goal: Physical Therapy Goal  Description: Goals to be met by: 24     Patient will increase functional independence with mobility by performin. Supine to sit with supervision.   2. Sit to supine with supervision.   3. Sit<>stand transfer with supervision using rolling walker.   4. Gait > 150 feet with SBA using rolling walker.   5. Ascend/descend threshold stairs with CGA and AD.  Outcome: Progressing     Anticipated d/c to home this PM, POC established to ensure continuity of care in case of change of d/c plans.

## 2024-12-16 NOTE — ANESTHESIA PROCEDURE NOTES
Spinal    Diagnosis: DJD left knee  Patient location during procedure: holding area  Timeout: 12/16/2024 10:02 AM    Staffing  Authorizing Provider: Nikhil Tatum MD  Performing Provider: Nikhil Tatum MD    Staffing  Performed by: Nikhil Tatum MD  Authorized by: Nikhil Tatum MD    Preanesthetic Checklist  Completed: patient identified, IV checked, site marked, risks and benefits discussed, surgical consent, monitors and equipment checked, pre-op evaluation and timeout performed  Spinal Block  Patient position: sitting  Prep: ChloraPrep  Patient monitoring: heart rate, cardiac monitor, continuous pulse ox and frequent blood pressure checks  Approach: midline  Location: L3-4  Injection technique: single shot  CSF Fluid: clear free-flowing CSF  Needle  Needle gauge: 24 G  Needle length: 4 in  Additional Documentation: incremental injection, negative aspiration for heme and no paresthesia on injection  Needle localization: anatomical landmarks  Assessment  Ease of block: easy  Patient's tolerance of the procedure: comfortable throughout block and no complaints  Medications:    Medications: ropivacaine (NAROPIN) injection 0.5% - Intraspinal   3 mL - 12/16/2024 10:05:00 AM

## 2024-12-16 NOTE — TRANSFER OF CARE
"Anesthesia Transfer of Care Note    Patient: Steph Quigley    Procedure(s) Performed: Procedure(s) (LRB):  ARTHROPLASTY, KNEE, TOTAL (Left)    Anesthesia Type: MAC and spinal    Transport from OR: Transported from OR on 6-10 L/min O2 by face mask with adequate spontaneous ventilation    Post pain: adequate analgesia    Post assessment: no apparent anesthetic complications    Post vital signs: stable    Level of consciousness: awake    Nausea/Vomiting: no nausea/vomiting    Complications: none    Transfer of care protocol was followed      Last vitals: Visit Vitals  /76 (BP Location: Right arm, Patient Position: Lying)   Pulse 76   Temp 36.7 °C (98 °F) (Oral)   Resp 14   Ht 5' 5" (1.651 m)   Wt 106.6 kg (235 lb)   LMP 06/01/2021 (Exact Date)   SpO2 99%   Breastfeeding No   BMI 39.11 kg/m²     "

## 2024-12-16 NOTE — DISCHARGE SUMMARY
[facility]  [address]  [place] [address]  [phone]               [facility] Discharge Orders    Home Vip[place]         Expected Discharge Date: 12/16/24       ADMIT TO HOME HEALTH DATE:12/18/24    Diagnoses:  Post-op L TKR replacement    Patient is homebound due to:   Pt requires home health services due to taxing effort to leave the home as a result of immobility from Post-op L TKR  replacement    Weight Bearing Status:   full weight bearing: FWB unless otherwise indicated.  Progress to cane as able.  Set up for outpatient PT as soon as able after staple removal once patient is MOD 1 with cane.    Physical Therapy:   3 times a week for 2 weeks (Call for further orders beyond 2 weeks)  - Ambulate with a rolling walker  - Progress to cane  - Instruct on ROM and strengthening of knee    Aide to provide assistance with personal care and  ADLs  2 times a week for 2 weeks    Wound Care: DAILY AND PRN  Surgical dressing change:Starting on  post op day 2 then daily and prn saturation.Change dressing while knee in flexed position utilizing collagen dressing then apply telfa cover dressing. Teach patient to change daily.   Surgical dressing is water resistant. Protect surgical dressing from getting wet by using press and seal saranwrap or garbage bag. Removal and replacement of dressing after shower only needed if incision is suspected  to have gotten wet during shower.  Otherwise change as previously described depending on dressing/drainage. No soaking in the tub or hot tub use for 6 weeks.    PT/SN to remove staples 14 days Post-op and apply skin prep and steri-strips.    Cold therapy/Ice: Encouraged at least 20 minutes 2-3 times daily or more if desired.  Incision must be kept waterproof while icing.  Wear TEDS Bilateral Thigh High Stockings for 3 weeks. Anny hose x 3 weeks. Ok to remove anny hose 1-2 hours/day max if desired.   If CPM ordered Utilize 2 hours in morning and 2  hours in evening. , Start at -5 degrees extension and 50 degree flexion. Increase flexion 10 degrees daily. Low post op mobility.       Contact:  Please contact Dr Zoran Hardwick [phone] with concerns.        BLOOD THINNER:    If sent home on Xarelto         -14 days post-op for TKR       -30 days post-op for THR     If sent home home on ASA    81mg   BID x 4 weeks     Once finished with prescribed blood thinner, patients can return to pre-surgical ASA dosage if they took ASA before surgery.       Outpatient Therapy: [facility]    [address] [address]   or  [address] [place] [address]    Call [phone] to schedule appointment  Fax [phone]    If need orders: Call Milly at Ext 241        DME:  - rolling Walker  - 3 in 1 commode.   - tub bench / shower chair  - Per PT/OT recommendation          Zoran Hardwick

## 2024-12-16 NOTE — OP NOTE
[facility]  Operative Report    SUMMARY     Surgery Date: 12/16/2024     Surgeons and Role:     * Zoran Hardwick MD - Primary    Assistant: NIRMAL Carlin    Pre-op Diagnosis:  Unilateral primary osteoarthritis, left knee [M17.12]    Post-op Diagnosis:  Post-Op Diagnosis Codes:     * Unilateral primary osteoarthritis, left knee [M17.12]    Procedure(s) (LRB):  ARTHROPLASTY, KNEE, TOTAL (Left) ([facility])    Anesthesia: Spinal    Description of Procedure: The appropriate consent was signed. The patient understood and except all risks and complications. The patient was brought to the Operating Room after undergoing spinal anesthetic. Tourniquet was applied to the proximal operative leg. The lower extremity was then prepped and draped in a sterile manner. After exsanguination of Esmarch bandage, tourniquet was inflated to 300 mmHg. An anterior incision with a medial parapatellar arthrotomy was performed. The menisci, anterior cruciate ligament and patellar fat pad were excised. The patella was resurfaced and sized to a 35 mm patella.   Three holes were then drilled for the lugs and this was trialed. A hole was then  drilled in the distal femur, holland was placed down the femoral canal and the   distal femur cut in 6 degrees of valgus. The tibia was then cut  with the extramedullary device   in 0 degree varus valgus with 5 degrees in posterior   slope. Extension block was placed and full extension was noted. The femur was   then sized to a #6 and #6 cutting block was placed and the anterior and   posterior cuts as well as chamfer cuts were performed. The tibia was sized to a  size D and a trial was performed.Trials were performed and a 14 mm spacer was felt to be appropriate.The tibia was then prepared with the drill and the punch, and trials were   removed and the knee washed out. Aquamantys was used to paint the posterior   capsule and corners. Palacos cement with gentamicin was then mixed and    pressurized sequentially in tibia, femur and patella. Components were impacted   and excess cement was removed. A trial 14 mm spacer was placed and knee   brought out to full extension. Once the cement was allowed to harden, the 14 mm  spacer was felt to be appropriate and this was locked into the tibial   baseplate. Tourniquet was deflated and hemostasis was obtained. Good patellar   tracking was noted. Exparel cocktail was injected into the fascia and   subcutaneous tissue. The fascial closure was obtained with a running #2 Quill suture. Subcutaneous closure was obtained with #1 and 2-0 Vicryl. Skin was then closed with the staples and a sterile compressive dressing was applied. The patient was then brought to the Recovery Room in a good condition.        Estimated Blood Loss: 100cc         Specimens:   Specimen (24h ago, onward)      None

## 2024-12-16 NOTE — PLAN OF CARE
Case Management Final Discharge Note      Discharge Disposition: Home with HH ([facility])    New DME ordered / company name: Rw from [facility]    Relevant SDOH / Transition of Care Barriers:  None    Primary Caretaker and contact information: Son    Scheduled followup appointment: [place]     Referrals placed: NA    Transportation: Family     Patient and family educated on discharge services and updated on DC plan. Bedside RN notified, patient clear to discharge from Case Management Perspective.    [facility] - Surgery ([place])  Discharge Final Note    Primary Care Provider: Concepcion Chris NP    Expected Discharge Date: 12/16/2024    Final Discharge Note (most recent)       Final Note - 12/16/24 1353          Final Note    Assessment Type Final Discharge Note (P)      Anticipated Discharge Disposition Home-Health Care Svc (P)      Hospital Resources/Appts/Education Provided Provided patient/caregiver with written discharge plan information;Appointments scheduled and added to AVS (P)         Post-Acute Status    Post-Acute Authorization Home Health (P)      Home Health Status Set-up Complete/Auth obtained (P)      Discharge Delays None known at this time (P)                      Contact Info       Zoran Hardwick MD   Specialty: Orthopedic Surgery    [address] [facility]  [facility] [address]   Phone: [phone]       Next Steps: Follow up in 4 week(s)    [facility]   Specialty: Home Health Services    [address] [place] [address]   Phone: [phone]       Next Steps: Follow up on 12/18/2024    Instructions: They will contact you for home healthcare appointments.    [facility]   Specialty: DME Provider, Orthotics    [address] [place] [address] A  [facility] [address]   Phone: [phone]       Next Steps: Follow up    Instructions: Contact if you have any issues with your medical equipment.

## 2024-12-16 NOTE — PLAN OF CARE
LMSW contacted patient's son. Patient denies the use of HH or DME. Patient is agreeable to a RW and BSC. Patient is agreeable to MD's preferred choice for HH. I provided the patient a choice of post acute providers and offered a list of CMS rated HH. Patient has declined to select a preferred provider and elects placement with the first accepting in network provider that is available to provide services as ordered by the physician. Patient choice pharmacy is bedside. Patient's PCP is correct. Patient's family will transport the patient home at discharge.     [facility] ([place])  Initial Discharge Assessment       Primary Care Provider: Concepcion Chris NP    Admission Diagnosis: Unilateral primary osteoarthritis, left knee [M17.12]    Admission Date: 12/16/2024  Expected Discharge Date: 12/16/2024    Transition of Care Barriers: (P) None    Payor: BLUE CROSS BLUE SHIELD / Plan: BLUE CONNECT / Product Type: HMO /     Extended Emergency Contact Information  Primary Emergency Contact: Jose Epstein   [facility]  Home Phone: [phone]  Relation: Son  Secondary Emergency Contact: Ana Quigley  Home Phone: [phone]  Mobile Phone: [phone]  Relation: Sister    Discharge Plan A: (P) Home Health  Discharge Plan B: (P) Home Health      [id number] [address] & [address] [address]  Phone: [phone] Fax: [phone]      Initial Assessment (most recent)       Adult Discharge Assessment - 12/16/24 1202          Discharge Assessment    Assessment Type Discharge Planning Assessment     Confirmed/corrected address, phone number and insurance Yes     Confirmed Demographics Correct on Facesheet     Source of Information family;health record     People in Home child(jozef), adult     Do you expect to return to your current living situation? Yes     Do you have help at home or someone to help you manage your care at  home? Yes     Prior to hospitilization cognitive status: Unable to Assess     Current cognitive status: Unable to Assess     Equipment Currently Used at Home none (P)      Readmission within 30 days? No (P)      Patient currently being followed by outpatient case management? No (P)      Do you currently have service(s) that help you manage your care at home? No (P)      Do you take prescription medications? Yes (P)      Do you have prescription coverage? Yes (P)      Do you have any problems affording any of your prescribed medications? No (P)      Is the patient taking medications as prescribed? yes (P)      How do you get to doctors appointments? family or friend will provide (P)      Are you on dialysis? No (P)      Do you take coumadin? No (P)      Discharge Plan A Home Health (P)      Discharge Plan B Home Health (P)      Discharge Plan discussed with: Spouse/sig other (P)      Transition of Care Barriers None (P)

## 2024-12-16 NOTE — DISCHARGE INSTRUCTIONS
Knee Athroplasty Discharge Instructions    Pain:     After surgery your knee will be sore. The knee will likely have been injected with a numbing medicine (Exparel) prior to completion of surgery for pain control. This is indicated on a green bracelet that you will continue to wear for 4 days after surgery. Ice and elevation will assist with pain control.    Apply ice as much as possible for the first 72 hours. After 72 hours, apply ice for 20minutes after therapy or whenever experiencing pain. Avoid direct skin contact with ice to prevent frostbit.           Elevate the affected leg with the pillow the length of the leg higher than your heart to assist with swelling and pain.  2.  Incision Care:    Some drainage from the incision in the first 72 hours is normal. If drainage is excessive, reinforce dressing and call your surgeon.  Call [phone]. Do not go to the ER. Keep incision clean, dry and covered until staples removed. Staples will be removed 14-21 days after surgery.    3.  Activity:                  -Perform exercises 2 x day.  -You may shower 48 hours after surgery  providing the dressing is waterproof. You can wrap the knee or hip with press n seal saran wrap to assist with keeping the dressing dry while showering.   No tub or hot tub usage for 4 weeks after surgery. Support help is mandatory during showering. If the  dressing becomes wet, replace with a new dressing. Do not leave a wet dressing on.   -Wear anny hose to both extremities  for 3 weeks after surgery .You may remove stockings for 1- 2 hours during the day only.            -If your physician orders the CPM machine you are to use it for 2 hours in the am and 2 hours in the pm.Start the machine at -5 extension and 50 degree flexion. Increase flexion 5 degrees each SESSION. This is not to replace your exercise program.    5. Possible Complication:  Infections: Report these signs and symptoms to your surgeon:  Unexpected redness around  incision  Persistent drainage from wound after 72 hours  Temperature Greater than 101 degrees F  Additional swelling  Pain not controlled with current pain medication  Blood Clot: Report these signs and symptoms to your surgeon:  Unusual pain, unusual warm skin  Red or discolored skin  Swelling in the leg     You may need antibiotic coverage before dental or minor surgical procedures.    If you are discharged on Aspirin 81 mg twice a day, please administer for 1 month. If you are sent home on Eliquis follow your physician's orders.              Your Surgeon Gave You EXPAREL® (bupivacaine liposome injectable suspension)    To help control your pain after surgery, your surgeon injected EXPAREL into your surgical incision just before the end of the procedure.   EXPAREL is a local analgesic that contains the local anesthetic bupivacaine. Local anesthetics provide pain relief by numbing the tissue  around the surgical site.   EXPAREL is specifically designed to release pain medication over time and can control pain for up to 72 hours.   In addition to EXPAREL, your surgeon may provide other pain medications to control your pain.   Each patient is different and responds differently to painmedication. Depending on how you respond to EXPAREL, you may require less  additional pain medication during your recovery.    Possible Side Effects    Side effects can occur with any medication and it is important not to ignore anything you may be experiencing. Some patients who  received EXPAREL experienced nausea, vomiting, or constipation. Rarely, patients who receive bupivacaine (the active ingredient in  EXPAREL) have experienced numbness and tingling in their mouth or lips, lightheadedness, or anxiety. Speak with your doctor right  away if you think you may be experiencing any of these sensations, or if you have other questions regarding possible side effects.    Your Recovery    When your pain is under control, your body can  better focus on healing. This is not the time to test your pain tolerance, or grin and  bear it.Work with your surgeon and nurse to make your recovery as speedy and pain-free as possible.   Follow the post-op orders your nurse gave you.   Eat a healthy diet and drink plenty of water. Surgery stresses your body; your body responds by needing more energy to heal.      Important Information About EXPAREL  Products that contain bupivacaine, like EXPAREL, may cause a temporary loss of  sensation or the ability to move in the area where bupivacaine was injected.    Date Administered: 12/16/24  Time Administered: 10:31am    Other Forms of Bupivicaine should not be administered within 96hrs following administration of EXPAREL.       Remove Scopolamine patch on post op day 2.  Ex (surgery on Mon, remove on Wed)  Wash hands thoroughly after removing patch and wash area where patch was placed.  Fold in half and discard in garbage.    Polar Care Cube Instructions

## 2024-12-16 NOTE — PT/OT/SLP EVAL
Physical Therapy Evaluation and Treatment    Patient Name:  Steph Quigley   MRN:  [id number]    Recommendations:     Discharge Recommendations: Low Intensity Therapy   Discharge Equipment Recommendations: walker, rolling, bedside commode   Barriers to discharge: None    Assessment:     Steph Quigley is a 54 y.o. female admitted with a medical diagnosis of Unilateral primary osteoarthritis, left knee.  She presents with the following impairments/functional limitations: weakness, impaired sensation, impaired functional mobility, gait instability, decreased coordination, decreased lower extremity function, decreased safety awareness, pain, decreased ROM, impaired skin, edema, orthopedic precautions, impaired self care skills.    Patient evaluated by PT and goals established. Patient with severe pain throughout session and appropriate ROM (knee flexion 5-90 deg). Gait, bed mobility, transfer, and stair training performed with pt requiring CGA progressing to SBA. Patient appropriate for dc to home with LIT at this time.    The mobility limitation cannot be sufficiently resolved by the use of a cane.   Patient's functional mobility deficit can be sufficiently resolved with the use of a rolling walker. Patient's mobility limitation significantly impairs their ability to participate in one of more activities of daily living. The use of a rolling walker will significantly improve the patient's ability to participate in MRADLS and the patient will use it on regular basis in the home.     Rehab Prognosis: Good; patient would benefit from acute skilled PT services to address these deficits and reach maximum level of function.    Recent Surgery: Procedure(s) (LRB):  ARTHROPLASTY, KNEE, TOTAL (Left) Day of Surgery    Plan:     During this hospitalization, patient to be seen daily to address the identified rehab impairments via gait training, therapeutic activities, therapeutic exercises and progress toward the following  goals:    Plan of Care Expires:  24    Subjective     Chief Complaint: Pain in knee/leg reports uncontrolled on current pain meds, onset of nausea with longer ambulation bout  Patient/Family Comments/goals: Goal to have less pain, wants to go home; Patient agreeable to evaluation.  Pain/Comfort:  Pain Rating 1:  (severe pain unrated)  Location - Side 1: Left  Location 1: knee  Pain Addressed 1: Reposition, Distraction, Cessation of Activity, Nurse notified  Pain Rating Post-Intervention 1:  (reports pain unchanged)    Patients cultural, spiritual, Jain conflicts given the current situation: no    Living Environment:  Lives in [facility] with GISELLA. Son at bedside and engaged in therapy session.  Prior to admission, patients level of function was indep.  Equipment used at home: none.  Upon discharge, patient will have assistance from her son.    Objective:     Communicated with RN prior to session.  Patient found  lying in bed with legs dangling over FOB  with peripheral IV, cryotherapy  upon PT entry to room.    General Precautions: Standard, fall  Orthopedic Precautions:LLE weight bearing as tolerated   Braces: N/A  Respiratory Status: Room air    Patient donned non slip socks and gait belt for OOB mobility.    Exams:  Cognition:   Patient is oriented to name, , date, place, situation.  Pt follows approximately 100% of one step commands.    Mood: Cooperative, flat affect.   Musculoskeletal:  Posture: Protective guarding surgical joint  LE ROM/Strength: 5/5 bilateral hip flexion, nonsurgical knee extension, bilateral ankle dorsiflexion. AROM surgical knee difficult to accurately assess with large bulky dressing, although knee flexion grossly 5>90 degrees. Surgical knee strength grossly 5/5 knee flexion and 3-/5 knee extension.  Neuromuscular:  Coordination/Tone/Reflexes: No impairments identified with functional mobility. No formal testing performed.   Balance: CGA for dynamic standing with bilateral UE  "support.   Visual-vestibular: No impairments identified with functional mobility. No formal testing performed.  Integument:  Visible skin intact and surgical extremity dressing clean and dry.   Cardiopulmonary:  Edema: Mild surgical extremity.      Functional Mobility:  Bed Mobility:     Supine to Sit: supervision and from FOB (legs dangling over EOB/FOB, came to sitting without difficulty)  Sit to Supine: supervision  Transfers:     Sit to Stand:  stand by assistance and contact guard assistance with rolling walker  Performs with BUE support on RW without overt difficulty coming to standing or RW instability  Gait: 2x60 ft with RW and CGA progressing to SBA.   Initial gait with increased forward flexion of trunk, improved with adjustment of RW to pt's height.   Demo's appropriate knee flexion during swing phase and no overt knee buckling.   Occasional standing rest breaks with one extended rest break towards end of gait bout with forearm support on RW with c/o nausea.    Stairs:  Pt ascended/descended 4" curb step with Rolling Walker with no handrails with Contact Guard Assistance.       AM-PAC 6 CLICK MOBILITY  Total Score:19       Treatment & Education:  Pt performed supine therapeutic exercises including ankle pumps, quad sets, glute sets, SLR, SAQs, heel slides x5 reps with verbal and tactile cues.   No extensor lag noted  Gait, transfers, and stairs as above  PT educated patient and son re:   PT plan of care/role of PT  Safety with OOB mobility  Use of RW  Positioning of LE and use of ice  Orthopedic precautions  Gait deviations  Therapeutic exercises  Discharge disposition    Pt and son verbalized understanding       Patient left HOB elevated with all lines intact, call button in reach, Rn and family present, and   ice applied and LE positioned with pillow to encourage extension of knee and neutral rotation of hip.    GOALS:   Multidisciplinary Problems       Physical Therapy Goals          Problem: Physical " Therapy    Goal Priority Disciplines Outcome Interventions   Physical Therapy Goal     PT, PT/OT Progressing    Description: Goals to be met by: 24     Patient will increase functional independence with mobility by performin. Supine to sit with supervision.   2. Sit to supine with supervision.   3. Sit<>stand transfer with supervision using rolling walker.   4. Gait > 150 feet with SBA using rolling walker.   5. Ascend/descend threshold stairs with CGA and AD.                       History:     Past Medical History:   Diagnosis Date    Anxiety     Anxiety disorder, unspecified     Arthritis     Depression     Kidney stone     PONV (postoperative nausea and vomiting)     Smoker     Wears reading eyeglasses        Past Surgical History:   Procedure Laterality Date    HYSTERECTOMY  2022    INTRAUTERINE DEVICE INSERTION  2016    MIRENA    KIDNEY STONE SURGERY      removal    KIDNEY STONE SURGERY      kidney stone surgical removal Left     KNEE SURGERY Right 2019    TOTAL KNEE ARTHROPLASTY Right 2023    Procedure: ARTHROPLASTY, KNEE, TOTAL;  Surgeon: Zoran Hardwick MD;  Location: [facility];  Service: Orthopedics;  Laterality: Right;  OFIRMEV    TOTAL REDUCTION MAMMOPLASTY         Time Tracking:     PT Received On: 24  PT Start Time: 1437     PT Stop Time: 1454  PT Total Time (min): 17 min     Billable Minutes: Evaluation 5 and Therapeutic Activity 12      2024

## 2024-12-17 VITALS
WEIGHT: 235 LBS | SYSTOLIC BLOOD PRESSURE: 124 MMHG | DIASTOLIC BLOOD PRESSURE: 58 MMHG | OXYGEN SATURATION: 100 % | TEMPERATURE: 97 F | RESPIRATION RATE: 18 BRPM | HEIGHT: 65 IN | BODY MASS INDEX: 39.15 KG/M2 | HEART RATE: 91 BPM

## 2024-12-31 DIAGNOSIS — T84.84XA PAIN DUE TO TOTAL LEFT KNEE REPLACEMENT, INITIAL ENCOUNTER: Primary | ICD-10-CM

## 2024-12-31 DIAGNOSIS — Z96.652 PAIN DUE TO TOTAL LEFT KNEE REPLACEMENT, INITIAL ENCOUNTER: Primary | ICD-10-CM

## 2024-12-31 DIAGNOSIS — Z96.652 PRESENCE OF LEFT ARTIFICIAL KNEE JOINT: Primary | ICD-10-CM

## 2025-01-08 ENCOUNTER — CLINICAL SUPPORT (OUTPATIENT)
Dept: REHABILITATION | Facility: HOSPITAL | Age: 55
End: 2025-01-08
Attending: ORTHOPAEDIC SURGERY
Payer: COMMERCIAL

## 2025-01-08 DIAGNOSIS — M25.662 DECREASED ROM OF LEFT KNEE: Primary | ICD-10-CM

## 2025-01-08 DIAGNOSIS — M62.81 QUADRICEPS WEAKNESS: ICD-10-CM

## 2025-01-08 PROCEDURE — 97530 THERAPEUTIC ACTIVITIES: CPT | Mod: PN

## 2025-01-08 PROCEDURE — 97161 PT EVAL LOW COMPLEX 20 MIN: CPT | Mod: PN

## 2025-01-08 PROCEDURE — 97140 MANUAL THERAPY 1/> REGIONS: CPT | Mod: PN

## 2025-01-12 PROBLEM — M25.662 DECREASED ROM OF LEFT KNEE: Status: ACTIVE | Noted: 2025-01-12

## 2025-01-12 PROBLEM — M62.81 QUADRICEPS WEAKNESS: Status: ACTIVE | Noted: 2025-01-12

## 2025-01-13 NOTE — PLAN OF CARE
[facility]   Physical Therapy Initial Evaluation      Name: Steph Quigley  Clinic Number: [id number]    Therapy Diagnosis:   Encounter Diagnoses   Name Primary?    Decreased ROM of left knee Yes    Quadriceps weakness         Physician: Zoran Hardwick MD    Physician Orders: PT Eval and Treat   Medical Diagnosis from Referral: Z96.652 (ICD-10-CM) - Presence of left artificial knee joint   Evaluation Date: 1/8/2025  Authorization Period Expiration:   Plan of Care Expiration: 02/19/2025  Progress Note Due: 02/07/2025  Date of Surgery: 12/16/2024  Visit # / Visits authorized: 1/ 1   FOTO: 1/ 3    Precautions: Standard     Time In: 2:00  Time Out: 2:36  Total Billable Time: 36 minutes    Subjective     Date of onset: 12/16/2024    History of current condition - Steph reports: She had her knee replacment a few weeks ago and she had home health. She has been doing okay, her knee is just feeling a little stiff.     Falls: none     Imaging: X-Ray: Recent total knee arthroplasty with no acute fracture seen.     Prior Therapy: yes her other knee  Social History: Lives with their family  Occupation: LPN  Prior Level of Function: independent with ADLs, self care and leisurely task  Current Level of Function: limited with work related task, transfers and ADLs    Pain:  Current 1/10, worst 6/10, best 0/10   Location: left knee    Description: Aching, Throbbing, and Tight  Aggravating Factors: Sitting, Standing, and Flexing  Easing Factors: ice and rest    Patients goals: Get back to normal     Medical History:   Past Medical History:   Diagnosis Date    Anxiety     Anxiety disorder, unspecified     Arthritis     Depression     Kidney stone     PONV (postoperative nausea and vomiting)     Smoker     Wears reading eyeglasses        Surgical History:   Steph Quigley  has a past surgical history that includes Intrauterine device insertion (2016); Knee surgery (Right, 07/03/2019); Kidney stone surgery;  "Total Reduction Mammoplasty; kidney stone surgical removal (Left); Kidney stone surgery; Total knee arthroplasty (Right, 12/04/2023); Hysterectomy (11/17/2022); and Total knee arthroplasty (Left, 12/16/2024).    Medications:   Steph has a current medication list which includes the following prescription(s): alprazolam, aspirin, bupropion, desvenlafaxine succinate, ergocalciferol, lamotrigine, ondansetron, oxycodone-acetaminophen, and zolpidem.    Allergies:   Review of patient's allergies indicates:   Allergen Reactions    Three-leaf caper Other (See Comments)     "FOOD ALLERGY MAKES ME FEEL LIKE I'M HAVING A STROKE"    Ketorolac Hives and Itching     Pt tolerates aspirin and ibuprofen         Objective      Observation: scar is minimally draining, clear.     Posture: normal posture noted.     Functional Tests:  Gait: normal gait pattern    Knee Passive Range of Motion:    Comments   Left  2/0/117 degrees    Right  3/0/120 degrees        Ankle Passive Range of Motion:   Right  Left    Dorsiflexion 15 15   Plantarflexion 35 35       Quad Set: good quad set    Joint Mobility: normal patellar motion     Palpation: no tenderness to palpation noted     Sensation: intact to light touch, diminished minimally around scar    Edema: increased swelling noted around knee    Intake Outcome Measure for FOTO Knee Survey    Therapist reviewed FOTO scores for Steph Quigley on 1/8/2025.   FOTO report - see Media section or FOTO account episode details.    Intake Score: 50%         Treatment     Total Treatment time (time-based codes) separate from Evaluation: 24 minutes     Steph received the treatments listed below:      manual therapy techniques: Joint mobilizations were applied to the: Left knee for 8 minutes, including:  Tibial AP with inferior patellar glide, grade I-III    therapeutic activities to improve functional performance for 16  minutes, including:  POC education  HEP education  Heel prop 4 minutes   Quad set, " x15  Straight leg raise, x10  Sit to stand, x10    Patient Education and Home Exercises     Education provided:   - HEP education  - POC education     Written Home Exercises Provided: Yes. Exercises were reviewed and Steph was able to demonstrate them prior to the end of the session.  Steph demonstrated good  understanding of the education provided. See EMR under Patient Instructions for exercises provided during therapy sessions.    Assessment     Steph is a 54 y.o. female referred to outpatient Physical Therapy with a medical diagnosis of Z96.652 (ICD-10-CM) - Presence of left artificial knee joint. Patient presents with signs and symptoms consistent with the medical diagnosis. Steph has decreased knee range of motion and strength. She is limited with her self care, Adls, work related task and leisurely task. She makes a good candidate for skilled Physical Therapy to improve the above listed deficits.     Patient prognosis is Excellent.   Patient will benefit from skilled outpatient Physical Therapy to address the deficits stated above and in the chart below, provide patient /family education, and to maximize patientt's level of independence.     Plan of care discussed with patient: Yes  Patient's spiritual, cultural and educational needs considered and patient is agreeable to the plan of care and goals as stated below:     Anticipated Barriers for therapy: none noted     Medical Necessity is demonstrated by the following  History  Co-morbidities and personal factors that may impact the plan of care [x] LOW: no personal factors / co-morbidities  [] MODERATE: 1-2 personal factors / co-morbidities  [] HIGH: 3+ personal factors / co-morbidities    Moderate / High Support Documentation:   Co-morbidities affecting plan of care:     Personal Factors:   no deficits     Examination  Body Structures and Functions, activity limitations and participation restrictions that may impact the plan of care [x] LOW: addressing 1-2  elements  [] MODERATE: 3+ elements  [] HIGH: 4+ elements (please support below)    Moderate / High Support Documentation:      Clinical Presentation [x] LOW: stable  [] MODERATE: Evolving  [] HIGH: Unstable     Decision Making/ Complexity Score: low       Goals:  Short Term Goals: 3 weeks. Pt agrees with goals set.  Pt will demonstrate independence and compliance with initial HEP to improve independence and symptom management.   Pt will report Left knee pain </= 0/10 with ambulation and transfers to demonstrate improved condition and ability to complete her ADLs, self care and leisurely task.    Pt will complete 30 straight leg raises with no knee lag to demonstrate improved strength and endurance to her quadriceps  Pt will improve Left knee ROM by >= 25 % to improve tolerance for transfers, ambulation and self care.      Long Term Goals: 6 weeks. Pt agrees with goals set.   Pt will demonstrate independence and compliance with final HEP to continue managing symptoms and developing mobility, motor control and strength.    Pt will improve FOTO score to </= % limited to demonstrate improved functional mobility.    Pt will report Left knee pain </= 0/10 with ambulation, transfers and ADLs to demonstrate improved condition and ability to return to her PLOF.    Pt will complete 12 sits in the 30 second sit to stand test to demonstrate normal knee strength and endurance   Pt will improve Left knee ROM = to uninvolved side to improve tolerance for ambulation, transfers and return to work.    Pt goal:     Plan     Plan of care Certification: 1/8/2025 to 02/19/2025.    Outpatient Physical Therapy 2 times weekly for 6 weeks to include the following interventions: Electrical Stimulation NMES, Gait Training, Manual Therapy, Moist Heat/ Ice, Neuromuscular Re-ed, Patient Education, Self Care, Therapeutic Activities, and Therapeutic Exercise.     Lalit Pinon, PT, DPT        Physician's Signature:  _________________________________________ Date: ________________

## 2025-01-16 ENCOUNTER — CLINICAL SUPPORT (OUTPATIENT)
Dept: REHABILITATION | Facility: HOSPITAL | Age: 55
End: 2025-01-16
Payer: COMMERCIAL

## 2025-01-16 DIAGNOSIS — M62.81 QUADRICEPS WEAKNESS: Primary | ICD-10-CM

## 2025-01-16 PROCEDURE — 97530 THERAPEUTIC ACTIVITIES: CPT | Mod: PN

## 2025-01-16 PROCEDURE — 97140 MANUAL THERAPY 1/> REGIONS: CPT | Mod: PN

## 2025-01-16 NOTE — PROGRESS NOTES
[facility]   Physical Therapy Treatment Note     Name: Steph Dann  Clinic Number: [id number]    Therapy Diagnosis:   Encounter Diagnosis   Name Primary?    Quadriceps weakness Yes     Physician: Zoran Hardwick MD    Visit Date: 1/16/2025    Physician Orders: PT Eval and Treat   Medical Diagnosis from Referral: Z96.652 (ICD-10-CM) - Presence of left artificial knee joint   Evaluation Date: 1/8/2025  Authorization Period Expiration: 12/31/2025  Plan of Care Expiration: 02/19/2025  Progress Note Due: 02/07/2025  Date of Surgery: 12/16/2024  Visit # / Visits authorized: 1/ 20   FOTO: 1/ 3     Precautions: Standard     PTA Visit #: 0/5     FOTO first follow up:   FOTO second follow up:     Time In: 2:00  Time Out: 2:34  Total Billable Time: 34 minutes    SUBJECTIVE     Pt reports: She is doing well.  She was compliant with home exercise program.  Response to previous treatment: improved knee range of motion and strength   Functional change: as above     Pain: 0/10  Location: left knee      OBJECTIVE     Objective Measures updated at progress report unless specified.     Treatment     Steph received the treatments listed below:      therapeutic exercises to develop strength, endurance, ROM, and flexibility for 00 minutes including:    manual therapy techniques: Joint mobilizations were applied to the: Left knee for 10 minutes, including:  Tibial hinge mobilization grade I-III   Tibial AP mobilization grade I-III     neuromuscular re-education activities to improve: Balance, Coordination, Kinesthetic, Sense, and Proprioception for 00 minutes. The following activities were included:      therapeutic activities to improve functional performance for 24  minutes, including:  Heel prop with quad set, x30, 3 second hold   Straight leg raise, 3 x 8, 2#   Double leg bridge, 3 x 10   Side lying red theraband, 3 x 12 red theraband   Double leg squat, 3 x 8   Double leg shuttle 75#, 3 x8   Single leg  shuttle 37, 3 x8     gait training to improve functional mobility and safety for 00  minutes, including:      Patient Education and Home Exercises     Home Exercises Provided and Patient Education Provided     Education provided:   - HEP education   - POC education     Written Home Exercises Provided: yes. Exercises were reviewed and Steph was able to demonstrate them prior to the end of the session.  Steph demonstrated good  understanding of the education provided. See EMR under Patient Instructions for exercises provided during therapy sessions    ASSESSMENT     Steph completed therapy with no complications. Therapy focused on developing her functional strength and her ability to transfer. She completed therapy with no complications and noted that her knee felt better following strengthening. Therapy will look to advance as tolerated.     Steph Is progressing well towards her goals.   Pt prognosis is Excellent.     Pt will continue to benefit from skilled outpatient physical therapy to address the deficits listed in the problem list box on initial evaluation, provide pt/family education and to maximize pt's level of independence in the home and community environment.     Pt's spiritual, cultural and educational needs considered and pt agreeable to plan of care and goals.     Anticipated barriers to physical therapy: none noted     Goals:  Short Term Goals: 3 weeks. Pt agrees with goals set.  Pt will demonstrate independence and compliance with initial HEP to improve independence and symptom management.   Pt will report Left knee pain </= 0/10 with ambulation and transfers to demonstrate improved condition and ability to complete her ADLs, self care and leisurely task.    Pt will complete 30 straight leg raises with no knee lag to demonstrate improved strength and endurance to her quadriceps  Pt will improve Left knee ROM by >= 25 % to improve tolerance for transfers, ambulation and self care.       Long Term  Goals: 6 weeks. Pt agrees with goals set.   Pt will demonstrate independence and compliance with final HEP to continue managing symptoms and developing mobility, motor control and strength.    Pt will improve FOTO score to </= % limited to demonstrate improved functional mobility.    Pt will report Left knee pain </= 0/10 with ambulation, transfers and ADLs to demonstrate improved condition and ability to return to her PLOF.    Pt will complete 12 sits in the 30 second sit to stand test to demonstrate normal knee strength and endurance   Pt will improve Left knee ROM = to uninvolved side to improve tolerance for ambulation, transfers and return to work.    Pt goal:       PLAN     Plan of care Certification: 1/8/2025 to 02/19/2025.     Outpatient Physical Therapy 2 times weekly for 6 weeks to include the following interventions: Electrical Stimulation NMES, Gait Training, Manual Therapy, Moist Heat/ Ice, Neuromuscular Re-ed, Patient Education, Self Care, Therapeutic Activities, and Therapeutic Exercise.     Lalit Pinon, PT, DPT

## 2025-01-24 ENCOUNTER — CLINICAL SUPPORT (OUTPATIENT)
Dept: REHABILITATION | Facility: HOSPITAL | Age: 55
End: 2025-01-24
Payer: COMMERCIAL

## 2025-01-24 DIAGNOSIS — M25.662 DECREASED ROM OF LEFT KNEE: ICD-10-CM

## 2025-01-24 DIAGNOSIS — T84.84XA PAIN DUE TO TOTAL LEFT KNEE REPLACEMENT, INITIAL ENCOUNTER: ICD-10-CM

## 2025-01-24 DIAGNOSIS — Z96.652 PAIN DUE TO TOTAL LEFT KNEE REPLACEMENT, INITIAL ENCOUNTER: ICD-10-CM

## 2025-01-24 DIAGNOSIS — M62.81 QUADRICEPS WEAKNESS: Primary | ICD-10-CM

## 2025-01-24 PROCEDURE — 97530 THERAPEUTIC ACTIVITIES: CPT | Mod: PN,CQ

## 2025-01-24 NOTE — PROGRESS NOTES
[facility]   Physical Therapy Treatment Note     Name: Steph Quigley  Clinic Number: [id number]    Therapy Diagnosis:   Encounter Diagnoses   Name Primary?    Quadriceps weakness Yes    Decreased ROM of left knee     Pain due to total left knee replacement, initial encounter      Physician: Zoran Hardwick MD    Visit Date: 1/24/2025    Physician Orders: PT Eval and Treat   Medical Diagnosis from Referral: Z96.652 (ICD-10-CM) - Presence of left artificial knee joint   Evaluation Date: 1/8/2025  Authorization Period Expiration: 12/31/2025  Plan of Care Expiration: 02/19/2025  Progress Note Due: 02/07/2025  Date of Surgery: 12/16/2024  Visit # / Visits authorized: 2/ 20   FOTO: 1/ 3     Precautions: Standard     PTA Visit #: 0/5     FOTO first follow up:   FOTO second follow up:     Time In: 230  Time Out: 310  Total Billable Time: 35 minutes    SUBJECTIVE     Pt reports: She is doing well, just some calf pain periodically.   She was compliant with home exercise program.  Response to previous treatment: improved knee range of motion and strength   Functional change: as above     Pain: 0/10  Location: left knee      OBJECTIVE     Objective Measures updated at progress report unless specified.     Treatment     Steph received the treatments listed below:      therapeutic exercises to develop strength, endurance, ROM, and flexibility for 00 minutes including:    manual therapy techniques: Joint mobilizations were applied to the: Left knee for 0 minutes, including:    Tibial hinge mobilization grade I-III   Tibial AP mobilization grade I-III     neuromuscular re-education activities to improve: Balance, Coordination, Kinesthetic, Sense, and Proprioception for 00 minutes. The following activities were included:    therapeutic activities to improve functional performance for 35 minutes, including:    Heel prop with quad set, x30, 3 second hold   Straight leg raise, 3 x 8, 2#   Double leg bridge,  3 x 10   Side lying red theraband, 3 x 12 red theraband   Double leg squat, 3 x 8   Double leg shuttle 75#, 3 x8   Single leg shuttle 37, 3 x8     gait training to improve functional mobility and safety for 00  minutes, including:      Patient Education and Home Exercises     Home Exercises Provided and Patient Education Provided     Education provided:   - HEP education   - POC education     Written Home Exercises Provided: yes. Exercises were reviewed and Steph was able to demonstrate them prior to the end of the session.  Steph demonstrated good  understanding of the education provided. See EMR under Patient Instructions for exercises provided during therapy sessions    ASSESSMENT     Steph tolerated session well. No discomfort reported with exercises. No recreating calf pain during session and no discomfort reported. Progress strength and stability as able.     Steph Is progressing well towards her goals.   Pt prognosis is Excellent.     Pt will continue to benefit from skilled outpatient physical therapy to address the deficits listed in the problem list box on initial evaluation, provide pt/family education and to maximize pt's level of independence in the home and community environment.     Pt's spiritual, cultural and educational needs considered and pt agreeable to plan of care and goals.     Anticipated barriers to physical therapy: none noted     Goals:  Short Term Goals: 3 weeks. Pt agrees with goals set.  Pt will demonstrate independence and compliance with initial HEP to improve independence and symptom management.   Pt will report Left knee pain </= 0/10 with ambulation and transfers to demonstrate improved condition and ability to complete her ADLs, self care and leisurely task.    Pt will complete 30 straight leg raises with no knee lag to demonstrate improved strength and endurance to her quadriceps  Pt will improve Left knee ROM by >= 25 % to improve tolerance for transfers, ambulation and self  care.       Long Term Goals: 6 weeks. Pt agrees with goals set.   Pt will demonstrate independence and compliance with final HEP to continue managing symptoms and developing mobility, motor control and strength.    Pt will improve FOTO score to </= % limited to demonstrate improved functional mobility.    Pt will report Left knee pain </= 0/10 with ambulation, transfers and ADLs to demonstrate improved condition and ability to return to her PLOF.    Pt will complete 12 sits in the 30 second sit to stand test to demonstrate normal knee strength and endurance   Pt will improve Left knee ROM = to uninvolved side to improve tolerance for ambulation, transfers and return to work.    Pt goal:       PLAN     Plan of care Certification: 1/8/2025 to 02/19/2025.     Outpatient Physical Therapy 2 times weekly for 6 weeks to include the following interventions: Electrical Stimulation NMES, Gait Training, Manual Therapy, Moist Heat/ Ice, Neuromuscular Re-ed, Patient Education, Self Care, Therapeutic Activities, and Therapeutic Exercise.     Michelle Wild, PTA

## 2025-01-28 ENCOUNTER — CLINICAL SUPPORT (OUTPATIENT)
Dept: REHABILITATION | Facility: HOSPITAL | Age: 55
End: 2025-01-28
Payer: COMMERCIAL

## 2025-01-28 DIAGNOSIS — M25.662 DECREASED ROM OF LEFT KNEE: ICD-10-CM

## 2025-01-28 DIAGNOSIS — M62.81 QUADRICEPS WEAKNESS: Primary | ICD-10-CM

## 2025-01-28 PROCEDURE — 97530 THERAPEUTIC ACTIVITIES: CPT | Mod: PN,CQ

## 2025-01-28 NOTE — PROGRESS NOTES
[facility]   Physical Therapy Treatment Note     Name: Steph Dann  Clinic Number: [id number]    Therapy Diagnosis:   Encounter Diagnoses   Name Primary?    Quadriceps weakness Yes    Decreased ROM of left knee        Physician: Zoran Hardwick MD    Visit Date: 1/28/2025    Physician Orders: PT Eval and Treat   Medical Diagnosis from Referral: Z96.652 (ICD-10-CM) - Presence of left artificial knee joint   Evaluation Date: 1/8/2025  Authorization Period Expiration: 12/31/2025  Plan of Care Expiration: 02/19/2025  Progress Note Due: 02/07/2025  Date of Surgery: 12/16/2024  Visit # / Visits authorized: 3/ 20   FOTO: 1/ 3     Precautions: Standard     PTA Visit #: 0/5     FOTO first follow up:   FOTO second follow up:     Time In: 230 (patient late)   Time Out: 300  Total Billable Time: 30 minutes    SUBJECTIVE     Pt reports: no new complaints.   She was compliant with home exercise program.  Response to previous treatment: improved knee range of motion and strength   Functional change: as above     Pain: 0/10  Location: left knee      OBJECTIVE     Objective Measures updated at progress report unless specified.     Treatment     Steph received the treatments listed below:      therapeutic exercises to develop strength, endurance, ROM, and flexibility for 00 minutes including:    manual therapy techniques: Joint mobilizations were applied to the: Left knee for 0 minutes, including:    Tibial hinge mobilization grade I-III   Tibial AP mobilization grade I-III     neuromuscular re-education activities to improve: Balance, Coordination, Kinesthetic, Sense, and Proprioception for 00 minutes. The following activities were included:    therapeutic activities to improve functional performance for 35 minutes, including:    Heel prop with quad set, x30, 3 second hold   Straight leg raise, 3 x 8, 2#   Double leg bridge, 3 x 10   Side lying red theraband, 3 x 12 red theraband   Double leg squat, 3 x  8   Double leg shuttle 75#, 3 x8   Single leg shuttle 37, 3 x8     gait training to improve functional mobility and safety for 00  minutes, including:      Patient Education and Home Exercises     Home Exercises Provided and Patient Education Provided     Education provided:   - HEP education   - POC education     Written Home Exercises Provided: yes. Exercises were reviewed and Steph was able to demonstrate them prior to the end of the session.  Steph demonstrated good  understanding of the education provided. See EMR under Patient Instructions for exercises provided during therapy sessions    ASSESSMENT     Steph tolerated session well. No discomfort reported with exercises. Progress strength and stability as able.     Steph Is progressing well towards her goals.   Pt prognosis is Excellent.     Pt will continue to benefit from skilled outpatient physical therapy to address the deficits listed in the problem list box on initial evaluation, provide pt/family education and to maximize pt's level of independence in the home and community environment.     Pt's spiritual, cultural and educational needs considered and pt agreeable to plan of care and goals.     Anticipated barriers to physical therapy: none noted     Goals:  Short Term Goals: 3 weeks. Pt agrees with goals set.  Pt will demonstrate independence and compliance with initial HEP to improve independence and symptom management.   Pt will report Left knee pain </= 0/10 with ambulation and transfers to demonstrate improved condition and ability to complete her ADLs, self care and leisurely task.    Pt will complete 30 straight leg raises with no knee lag to demonstrate improved strength and endurance to her quadriceps  Pt will improve Left knee ROM by >= 25 % to improve tolerance for transfers, ambulation and self care.       Long Term Goals: 6 weeks. Pt agrees with goals set.   Pt will demonstrate independence and compliance with final HEP to continue managing  symptoms and developing mobility, motor control and strength.    Pt will improve FOTO score to </= % limited to demonstrate improved functional mobility.    Pt will report Left knee pain </= 0/10 with ambulation, transfers and ADLs to demonstrate improved condition and ability to return to her PLOF.    Pt will complete 12 sits in the 30 second sit to stand test to demonstrate normal knee strength and endurance   Pt will improve Left knee ROM = to uninvolved side to improve tolerance for ambulation, transfers and return to work.    Pt goal:       PLAN     Plan of care Certification: 1/8/2025 to 02/19/2025.     Outpatient Physical Therapy 2 times weekly for 6 weeks to include the following interventions: Electrical Stimulation NMES, Gait Training, Manual Therapy, Moist Heat/ Ice, Neuromuscular Re-ed, Patient Education, Self Care, Therapeutic Activities, and Therapeutic Exercise.     Michelle Wild, PTA

## 2025-01-30 ENCOUNTER — CLINICAL SUPPORT (OUTPATIENT)
Dept: REHABILITATION | Facility: HOSPITAL | Age: 55
End: 2025-01-30
Payer: COMMERCIAL

## 2025-01-30 DIAGNOSIS — M62.81 QUADRICEPS WEAKNESS: Primary | ICD-10-CM

## 2025-01-30 PROCEDURE — 97530 THERAPEUTIC ACTIVITIES: CPT | Mod: PN,CQ

## 2025-01-30 NOTE — PROGRESS NOTES
[facility]   Physical Therapy Treatment Note     Name: Steph Quigley  Clinic Number: [id number]    Therapy Diagnosis:   Encounter Diagnosis   Name Primary?    Quadriceps weakness Yes       Physician: Zoran Hardwick MD    Visit Date: 1/30/2025    Physician Orders: PT Eval and Treat   Medical Diagnosis from Referral: Z96.652 (ICD-10-CM) - Presence of left artificial knee joint   Evaluation Date: 1/8/2025  Authorization Period Expiration: 12/31/2025  Plan of Care Expiration: 02/19/2025  Progress Note Due: 02/07/2025  Date of Surgery: 12/16/2024  Visit # / Visits authorized: 4/ 20   FOTO: 1/ 3     Precautions: Standard     PTA Visit #: 0/5     FOTO first follow up:   FOTO second follow up:     Time In: 200  Time Out: 238  Total Billable Time: 38 minutes    SUBJECTIVE     Pt reports: did her doppler test earlier. Feels like she is headed in the right direction with her knee.   She was compliant with home exercise program.  Response to previous treatment: improved knee range of motion and strength   Functional change: as above     Pain: 0/10  Location: left knee      OBJECTIVE     Objective Measures updated at progress report unless specified.     Treatment     Steph received the treatments listed below:      therapeutic exercises to develop strength, endurance, ROM, and flexibility for 00 minutes including:    manual therapy techniques: Joint mobilizations were applied to the: Left knee for 0 minutes, including:    Tibial hinge mobilization grade I-III   Tibial AP mobilization grade I-III     neuromuscular re-education activities to improve: Balance, Coordination, Kinesthetic, Sense, and Proprioception for 00 minutes. The following activities were included:    therapeutic activities to improve functional performance for 38 minutes, including:    Heel prop with quad set, x30, 3 second hold   Straight leg raise, 3 x 8, 2#   Double leg bridge, 3 x 10   Side lying red theraband, 3 x 12 red  theraband   Double leg squat, 3 x 8   Double leg shuttle 75#, 3 x8   Single leg shuttle 37, 3 x8   Precor hip abduction 55# 3 x 10     gait training to improve functional mobility and safety for 00  minutes, including:      Patient Education and Home Exercises     Home Exercises Provided and Patient Education Provided     Education provided:   - HEP education   - POC education     Written Home Exercises Provided: yes. Exercises were reviewed and Steph was able to demonstrate them prior to the end of the session.  Steph demonstrated good  understanding of the education provided. See EMR under Patient Instructions for exercises provided during therapy sessions    ASSESSMENT     Steph tolerated session well. No discomfort reported with exercises. Challenged with hip and left quad strength. Progress strength and stability as able.     Steph Is progressing well towards her goals.   Pt prognosis is Excellent.     Pt will continue to benefit from skilled outpatient physical therapy to address the deficits listed in the problem list box on initial evaluation, provide pt/family education and to maximize pt's level of independence in the home and community environment.     Pt's spiritual, cultural and educational needs considered and pt agreeable to plan of care and goals.     Anticipated barriers to physical therapy: none noted     Goals:  Short Term Goals: 3 weeks. Pt agrees with goals set.  Pt will demonstrate independence and compliance with initial HEP to improve independence and symptom management.   Pt will report Left knee pain </= 0/10 with ambulation and transfers to demonstrate improved condition and ability to complete her ADLs, self care and leisurely task.    Pt will complete 30 straight leg raises with no knee lag to demonstrate improved strength and endurance to her quadriceps  Pt will improve Left knee ROM by >= 25 % to improve tolerance for transfers, ambulation and self care.       Long Term Goals: 6 weeks.  Pt agrees with goals set.   Pt will demonstrate independence and compliance with final HEP to continue managing symptoms and developing mobility, motor control and strength.    Pt will improve FOTO score to </= % limited to demonstrate improved functional mobility.    Pt will report Left knee pain </= 0/10 with ambulation, transfers and ADLs to demonstrate improved condition and ability to return to her PLOF.    Pt will complete 12 sits in the 30 second sit to stand test to demonstrate normal knee strength and endurance   Pt will improve Left knee ROM = to uninvolved side to improve tolerance for ambulation, transfers and return to work.    Pt goal:       PLAN     Plan of care Certification: 1/8/2025 to 02/19/2025.     Outpatient Physical Therapy 2 times weekly for 6 weeks to include the following interventions: Electrical Stimulation NMES, Gait Training, Manual Therapy, Moist Heat/ Ice, Neuromuscular Re-ed, Patient Education, Self Care, Therapeutic Activities, and Therapeutic Exercise.     Michelle Wild, PTA

## 2025-01-31 ENCOUNTER — DOCUMENTATION ONLY (OUTPATIENT)
Dept: REHABILITATION | Facility: HOSPITAL | Age: 55
End: 2025-01-31
Payer: COMMERCIAL

## 2025-01-31 NOTE — PROGRESS NOTES
PT/PTA met face to face to discuss pt's treatment plan and progress towards established goals. Pt will be seen by a physical therapist minimally every 6th visit or every 30 days.      Michelle Wild PTA

## 2025-02-04 ENCOUNTER — CLINICAL SUPPORT (OUTPATIENT)
Dept: REHABILITATION | Facility: HOSPITAL | Age: 55
End: 2025-02-04
Payer: COMMERCIAL

## 2025-02-04 DIAGNOSIS — M62.81 QUADRICEPS WEAKNESS: Primary | ICD-10-CM

## 2025-02-04 PROCEDURE — 97530 THERAPEUTIC ACTIVITIES: CPT | Mod: PN,CQ

## 2025-02-04 NOTE — PROGRESS NOTES
[facility]   Physical Therapy Treatment Note     Name: Steph Dann  Clinic Number: [id number]    Therapy Diagnosis:   Encounter Diagnosis   Name Primary?    Quadriceps weakness Yes       Physician: Zoran Hardwick MD    Visit Date: 2/4/2025    Physician Orders: PT Eval and Treat   Medical Diagnosis from Referral: Z96.652 (ICD-10-CM) - Presence of left artificial knee joint   Evaluation Date: 1/8/2025  Authorization Period Expiration: 12/31/2025  Plan of Care Expiration: 02/19/2025  Progress Note Due: 02/07/2025  Date of Surgery: 12/16/2024  Visit # / Visits authorized: 5/ 20   FOTO: 1/ 3     Precautions: Standard     PTA Visit #: 0/5     FOTO first follow up:   FOTO second follow up:     Time In: 200  Time Out: 255  Total Billable Time: 55 minutes    SUBJECTIVE     Pt reports: she is doing good, looking for a new job.   She was compliant with home exercise program.  Response to previous treatment: improved knee range of motion and strength   Functional change: as above     Pain: 0/10  Location: left knee      OBJECTIVE     Objective Measures updated at progress report unless specified.     Treatment     Steph received the treatments listed below:      therapeutic exercises to develop strength, endurance, ROM, and flexibility for 00 minutes including:    manual therapy techniques: Joint mobilizations were applied to the: Left knee for 0 minutes, including:    Tibial hinge mobilization grade I-III   Tibial AP mobilization grade I-III     neuromuscular re-education activities to improve: Balance, Coordination, Kinesthetic, Sense, and Proprioception for 00 minutes. The following activities were included:    therapeutic activities to improve functional performance for 55 minutes, including:    Heel prop with quad set, x30, 3 second hold   Straight leg raise, 3 x 8, 2#   Double leg bridge, 3 x 10   Side lying red theraband, 3 x 12 red theraband   Double leg squat, 3 x 8   Double leg shuttle  75#, 3 x8   Single leg shuttle 37, 3 x8   Precor hip abduction 55# 3 x 10   Lateral walks Red Theraband 10 yards x 3 laps   Sled push/pull 20# 10 yards x 3 laps     gait training to improve functional mobility and safety for 00  minutes, including:      Patient Education and Home Exercises     Home Exercises Provided and Patient Education Provided     Education provided:   - HEP education   - POC education     Written Home Exercises Provided: yes. Exercises were reviewed and Steph was able to demonstrate them prior to the end of the session.  Steph demonstrated good  understanding of the education provided. See EMR under Patient Instructions for exercises provided during therapy sessions    ASSESSMENT     Steph tolerated session well. No discomfort reported with exercises. Challenged with hip and left quad strength, progressed strength exercises. During quick positional changes she had a vertigo episode.  Progress strength and stability as able.     Steph Is progressing well towards her goals.   Pt prognosis is Excellent.     Pt will continue to benefit from skilled outpatient physical therapy to address the deficits listed in the problem list box on initial evaluation, provide pt/family education and to maximize pt's level of independence in the home and community environment.     Pt's spiritual, cultural and educational needs considered and pt agreeable to plan of care and goals.     Anticipated barriers to physical therapy: none noted     Goals:  Short Term Goals: 3 weeks. Pt agrees with goals set.  Pt will demonstrate independence and compliance with initial HEP to improve independence and symptom management.   Pt will report Left knee pain </= 0/10 with ambulation and transfers to demonstrate improved condition and ability to complete her ADLs, self care and leisurely task.    Pt will complete 30 straight leg raises with no knee lag to demonstrate improved strength and endurance to her quadriceps  Pt will  improve Left knee ROM by >= 25 % to improve tolerance for transfers, ambulation and self care.       Long Term Goals: 6 weeks. Pt agrees with goals set.   Pt will demonstrate independence and compliance with final HEP to continue managing symptoms and developing mobility, motor control and strength.    Pt will improve FOTO score to </= % limited to demonstrate improved functional mobility.    Pt will report Left knee pain </= 0/10 with ambulation, transfers and ADLs to demonstrate improved condition and ability to return to her PLOF.    Pt will complete 12 sits in the 30 second sit to stand test to demonstrate normal knee strength and endurance   Pt will improve Left knee ROM = to uninvolved side to improve tolerance for ambulation, transfers and return to work.    Pt goal:       PLAN     Plan of care Certification: 1/8/2025 to 02/19/2025.     Outpatient Physical Therapy 2 times weekly for 6 weeks to include the following interventions: Electrical Stimulation NMES, Gait Training, Manual Therapy, Moist Heat/ Ice, Neuromuscular Re-ed, Patient Education, Self Care, Therapeutic Activities, and Therapeutic Exercise.     Michelle Wild, PTA

## 2025-02-11 ENCOUNTER — CLINICAL SUPPORT (OUTPATIENT)
Dept: REHABILITATION | Facility: HOSPITAL | Age: 55
End: 2025-02-11
Payer: COMMERCIAL

## 2025-02-11 DIAGNOSIS — M62.81 QUADRICEPS WEAKNESS: Primary | ICD-10-CM

## 2025-02-11 PROCEDURE — 97530 THERAPEUTIC ACTIVITIES: CPT | Mod: PN,CQ

## 2025-02-11 NOTE — PROGRESS NOTES
[facility]   Physical Therapy Treatment Note     Name: Steph Quigley  Clinic Number: [id number]    Therapy Diagnosis:   Encounter Diagnosis   Name Primary?    Quadriceps weakness Yes       Physician: Zoran Hardwick MD    Visit Date: 2/11/2025    Physician Orders: PT Eval and Treat   Medical Diagnosis from Referral: Z96.652 (ICD-10-CM) - Presence of left artificial knee joint   Evaluation Date: 1/8/2025  Authorization Period Expiration: 12/31/2025  Plan of Care Expiration: 02/19/2025  Progress Note Due: 02/07/2025  Date of Surgery: 12/16/2024  Visit # / Visits authorized: 6/ 20   FOTO: 1/ 3     Precautions: Standard     PTA Visit #: 0/5     FOTO first follow up:   FOTO second follow up:     Time In: 1400  Time Out: 1438  Total Billable Time: 38 minutes    SUBJECTIVE     Pt reports: no issues stated other than tightness when she puts her shoe on.   She was compliant with home exercise program.  Response to previous treatment: improved knee range of motion and strength   Functional change: as above     Pain: 0/10  Location: left knee      OBJECTIVE     Objective Measures updated at progress report unless specified.     Treatment     Steph received the treatments listed below:      therapeutic exercises to develop strength, endurance, ROM, and flexibility for 00 minutes including:    manual therapy techniques: Joint mobilizations were applied to the: Left knee for 0 minutes, including:    Tibial hinge mobilization grade I-III   Tibial AP mobilization grade I-III     neuromuscular re-education activities to improve: Balance, Coordination, Kinesthetic, Sense, and Proprioception for 00 minutes. The following activities were included:    therapeutic activities to improve functional performance for 38 minutes, including:    Heel prop with quad set, x30, 3 second hold   Straight leg raise, 3 x 8, 3#   Double leg bridge, 3 x 10   Side lying green theraband, 3 x 12   Double leg squat, 3 x 8   Double  leg shuttle 75#, 3 x8   Single leg shuttle 75#, 3 x8   Shuttle donkey kicks 12#, 3 x 8 Bilateral   Precor hip abduction 55# 3 x 10   Lateral walks Red Theraband 10 yards x 3 laps     gait training to improve functional mobility and safety for 00 minutes, including:      Patient Education and Home Exercises     Home Exercises Provided and Patient Education Provided     Education provided:   - HEP education   - POC education     Written Home Exercises Provided: yes. Exercises were reviewed and Steph was able to demonstrate them prior to the end of the session.  Steph demonstrated good  understanding of the education provided. See EMR under Patient Instructions for exercises provided during therapy sessions    ASSESSMENT     Steph tolerated session well. No discomfort reported with exercises. Showing improvements with strength and stability of Left Lower Extremity. Progress stability.     Steph Is progressing well towards her goals.   Pt prognosis is Excellent.     Pt will continue to benefit from skilled outpatient physical therapy to address the deficits listed in the problem list box on initial evaluation, provide pt/family education and to maximize pt's level of independence in the home and community environment.     Pt's spiritual, cultural and educational needs considered and pt agreeable to plan of care and goals.     Anticipated barriers to physical therapy: none noted     Goals:  Short Term Goals: 3 weeks. Pt agrees with goals set.  Pt will demonstrate independence and compliance with initial HEP to improve independence and symptom management.   Pt will report Left knee pain </= 0/10 with ambulation and transfers to demonstrate improved condition and ability to complete her ADLs, self care and leisurely task.    Pt will complete 30 straight leg raises with no knee lag to demonstrate improved strength and endurance to her quadriceps  Pt will improve Left knee ROM by >= 25 % to improve tolerance for transfers,  ambulation and self care.       Long Term Goals: 6 weeks. Pt agrees with goals set.   Pt will demonstrate independence and compliance with final HEP to continue managing symptoms and developing mobility, motor control and strength.    Pt will improve FOTO score to </= % limited to demonstrate improved functional mobility.    Pt will report Left knee pain </= 0/10 with ambulation, transfers and ADLs to demonstrate improved condition and ability to return to her PLOF.    Pt will complete 12 sits in the 30 second sit to stand test to demonstrate normal knee strength and endurance   Pt will improve Left knee ROM = to uninvolved side to improve tolerance for ambulation, transfers and return to work.    Pt goal:       PLAN     Plan of care Certification: 1/8/2025 to 02/19/2025.     Outpatient Physical Therapy 2 times weekly for 6 weeks to include the following interventions: Electrical Stimulation NMES, Gait Training, Manual Therapy, Moist Heat/ Ice, Neuromuscular Re-ed, Patient Education, Self Care, Therapeutic Activities, and Therapeutic Exercise.     Michelle Wild, PTA

## 2025-02-13 ENCOUNTER — CLINICAL SUPPORT (OUTPATIENT)
Dept: REHABILITATION | Facility: HOSPITAL | Age: 55
End: 2025-02-13
Payer: COMMERCIAL

## 2025-02-13 DIAGNOSIS — M62.81 QUADRICEPS WEAKNESS: Primary | ICD-10-CM

## 2025-02-13 PROCEDURE — 97110 THERAPEUTIC EXERCISES: CPT | Mod: PN

## 2025-02-13 PROCEDURE — 97530 THERAPEUTIC ACTIVITIES: CPT | Mod: PN

## 2025-02-13 NOTE — PROGRESS NOTES
[facility]   Physical Therapy Re-assessment Note     Name: Steph Quigley  Clinic Number: [id number]    Therapy Diagnosis:   Encounter Diagnosis   Name Primary?    Quadriceps weakness Yes     Physician: Zoran Hardwick MD    Visit Date: 2/13/2025    Physician Orders: PT Eval and Treat   Medical Diagnosis from Referral: Z96.652 (ICD-10-CM) - Presence of left artificial knee joint   Evaluation Date: 1/8/2025  Authorization Period Expiration: 12/31/2025  Plan of Care Expiration: 02/19/2025  Progress Note Due: 02/07/2025  Date of Surgery: 12/16/2024  Visit # / Visits authorized: 7/ 20   FOTO: 1/ 3     Precautions: Standard     PTA Visit #: 0/5     FOTO first follow up:   FOTO second follow up:     Time In: 1400  Time Out: 1455  Total Billable Time: 55 minutes    SUBJECTIVE     Pt reports: She feels that her knee moves a lot and noticed that when she put her shoe on earlier.   She was compliant with home exercise program.  Response to previous treatment: improved knee range of motion and strength   Functional change: as above     Pain: 0/10  Location: left knee      OBJECTIVE     Objective Measures updated at progress report unless specified.     Left knee range of motion: 0/0/120  Left knee: quad set activation = excellent   Left knee: 30 straight leg raises with no knee lag    Left knee:   Knee valgus test: increased motion, no pain noted  Knee varus test: (-)     Right knee:  Knee valgus test: (-)  Knee varus test: (-)  Treatment     tSeph received the treatments listed below:      therapeutic exercises to develop strength, endurance, ROM, and flexibility for 10 minutes including:  Assessment as seen above     manual therapy techniques: Joint mobilizations were applied to the: Left knee for 0 minutes, including:    Tibial hinge mobilization grade I-III   Tibial AP mobilization grade I-III     neuromuscular re-education activities to improve: Balance, Coordination, Kinesthetic, Sense, and  Proprioception for 00 minutes. The following activities were included:    therapeutic activities to improve functional performance for 45 minutes, including:    Heel prop with quad set, x30, 3 second hold   Straight leg raise, 3 x 12  staggered leg bridge, 3 x 10   Standing hip abduciton 3 x 12, 2#  Standing hip extension 3 x 12, 2#  Staggered stance stand up 3 x 6 B (lowest Hi-lo setting.)     Sled pushing and pulling 2 x10 yds     Education on sit to stand mechanics   Education on knee signs and symptoms   Education on following up with MD as necessary.     gait training to improve functional mobility and safety for 00 minutes, including:      Patient Education and Home Exercises     Home Exercises Provided and Patient Education Provided     Education provided:   - HEP education   - POC education     Written Home Exercises Provided: yes. Exercises were reviewed and Steph was able to demonstrate them prior to the end of the session.  Steph demonstrated good  understanding of the education provided. See EMR under Patient Instructions for exercises provided during therapy sessions    Re-ASSESSMENT     Steph was re-assessed on today; she has full and pain free knee range of motion, great quadriceps activation, and great quadriceps strength. Her concerns at this time are the increased motion at her tibial with certain motions such as crossing her legs or if she forces her knee back while in a seated position. She was found to have similar motion between her knees, but was educated on following up with her MD if she still had concerns in the coming days. Overall, she is doing well at this time and is confident in her HEP and managing her symptoms. D/C will be discussed in the coming visits.     Steph Is progressing well towards her goals.   Pt prognosis is Excellent.     Pt will continue to benefit from skilled outpatient physical therapy to address the deficits listed in the problem list box on initial evaluation,  provide pt/family education and to maximize pt's level of independence in the home and community environment.     Pt's spiritual, cultural and educational needs considered and pt agreeable to plan of care and goals.     Anticipated barriers to physical therapy: none noted     Goals:  Short Term Goals: 3 weeks. Pt agrees with goals set.  Pt will demonstrate independence and compliance with initial HEP to improve independence and symptom management.   Pt will report Left knee pain </= 0/10 with ambulation and transfers to demonstrate improved condition and ability to complete her ADLs, self care and leisurely task.    Pt will complete 30 straight leg raises with no knee lag to demonstrate improved strength and endurance to her quadriceps  Pt will improve Left knee ROM by >= 25 % to improve tolerance for transfers, ambulation and self care.       Long Term Goals: 6 weeks. Pt agrees with goals set.   Pt will demonstrate independence and compliance with final HEP to continue managing symptoms and developing mobility, motor control and strength.    Pt will improve FOTO score to </= % limited to demonstrate improved functional mobility.    Pt will report Left knee pain </= 0/10 with ambulation, transfers and ADLs to demonstrate improved condition and ability to return to her PLOF.    Pt will complete 12 sits in the 30 second sit to stand test to demonstrate normal knee strength and endurance   Pt will improve Left knee ROM = to uninvolved side to improve tolerance for ambulation, transfers and return to work.    Pt goal:       PLAN     Plan of care Certification: 1/8/2025 to 02/19/2025.     Outpatient Physical Therapy 2 times weekly for 6 weeks to include the following interventions: Electrical Stimulation NMES, Gait Training, Manual Therapy, Moist Heat/ Ice, Neuromuscular Re-ed, Patient Education, Self Care, Therapeutic Activities, and Therapeutic Exercise.     Lalit Pinon, PT, DPT

## 2025-02-18 ENCOUNTER — CLINICAL SUPPORT (OUTPATIENT)
Dept: REHABILITATION | Facility: HOSPITAL | Age: 55
End: 2025-02-18
Payer: COMMERCIAL

## 2025-02-18 DIAGNOSIS — M62.81 QUADRICEPS WEAKNESS: Primary | ICD-10-CM

## 2025-02-18 PROCEDURE — 97110 THERAPEUTIC EXERCISES: CPT | Mod: PN

## 2025-02-18 PROCEDURE — 97530 THERAPEUTIC ACTIVITIES: CPT | Mod: PN

## 2025-02-18 NOTE — PROGRESS NOTES
[facility]   Physical Therapy Discharge Note     Name: Steph Dann  Clinic Number: [id number]    Therapy Diagnosis:   Encounter Diagnosis   Name Primary?    Quadriceps weakness Yes     Physician: Zoran Hardwick MD    Visit Date: 2025    Physician Orders: PT Eval and Treat   Medical Diagnosis from Referral: Z96.652 (ICD-10-CM) - Presence of left artificial knee joint   Evaluation Date: 2025  Authorization Period Expiration: 2025  Plan of Care Expiration: 2025  Progress Note Due: 2025  Date of Surgery: 2024  Visit # / Visits authorized:    FOTO: 1/ 3     Precautions: Standard     PTA Visit #: 0     FOTO first follow up:   FOTO second follow up:     Time In: 1400  Time Out: 1430  Total Billable Time: 30 minutes    SUBJECTIVE     Pt reports: She feels like she is doing really good and can be done with Physical Therapy.   She was compliant with home exercise program.  Response to previous treatment: improved knee range of motion and strength   Functional change: as above     Pain: 0/10  Location: left knee      OBJECTIVE     Objective Measures updated at progress report unless specified.     2025  30 second sit stand: 8  5 time sit stand: 20.86  TU.05  Treatment     Steph received the treatments listed below:      therapeutic exercises to develop strength, endurance, ROM, and flexibility for 10 minutes including:  Assessment as seen above     neuromuscular re-education activities to improve: Balance, Coordination, Kinesthetic, Sense, and Proprioception for 00 minutes. The following activities were included:    therapeutic activities to improve functional performance for 20 minutes, including:    Bridge  x10  Clamshells x10  Side lying hip abduction, x10   Standing hip extension x10  Standing hip abduction x10  Straight leg raise x10  LAQ x10  HEP education  DC education     gait training to improve functional mobility and safety for 00  minutes, including:      Patient Education and Home Exercises     Home Exercises Provided and Patient Education Provided     Education provided:   - HEP education   - POC education     Written Home Exercises Provided: yes. Exercises were reviewed and Steph was able to demonstrate them prior to the end of the session.  Steph demonstrated good  understanding of the education provided. See EMR under Patient Instructions for exercises provided during therapy sessions    Discharge Summary     Date of Last visit: 02/18/2025  Total Visits Received: 08        Assessment      At this time Steph has full and pain free knee range of motion. She has improved functional capacity and ability to return to her PLOF. She currently demonstrates some weakness in functional movements, such as sit to stands, but this strength can be improved with good adherence to her HEP. She provided a final HEP and was educated on following up with her MD as necessary.    Goals: MET    Discharge reason: Patient is now asymptomatic, Patient has met all of his/her goals, Patient has reached the maximum rehab potential for the present time, and Patient requested discharge    Plan   This patient is discharged from Physical Therapy and is to cont with their HEP and MD follow up PRN.        Lalit Pinon, PT, DPT

## 2025-02-18 NOTE — LETTER
February 18, 2025  Zoran Hardwick MD  [address] [facility]  [place] [address]    To whom it may concern,     The attached plan of care is being sent to you for review and reference.    You may indicate your approval by signing the document electronically, or by faxing/mailing a signed copy of the final page of this document back to the attention of Lalit Pinon PT, DPT:         Plan of Care 2/18/25   Effective from: 2/18/2025  Effective to: 2/18/2025    Plan ID: [id number]            Participants as of Finalize on 2/18/2025    Name Type Comments Contact Info    Zoran Hardwick MD Referring Provider  [phone]    Lalit Pinon PT, DPT Physical Therapist  [phone]       Last Plan Note     Author: Lalit Pinon PT DPT Status: Incomplete Last edited: 2/18/2025  2:00 PM       [facility]   Physical Therapy Discharge Note     Name: Steph [facility] Number: [id number]    Therapy Diagnosis:   Encounter Diagnosis   Name Primary?    Quadriceps weakness Yes     Physician: Zoran Hardwick MD    Visit Date: 2/18/2025    Physician Orders: PT Eval and Treat   Medical Diagnosis from Referral: Z96.652 (ICD-10-CM) - Presence of left artificial knee joint   Evaluation Date: 1/8/2025  Authorization Period Expiration: 12/31/2025  Plan of Care Expiration: 02/19/2025  Progress Note Due: 02/07/2025  Date of Surgery: 12/16/2024  Visit # / Visits authorized: 8/ 20   FOTO: 1/ 3     Precautions: Standard     PTA Visit #: 0/5     FOTO first follow up:   FOTO second follow up:     Time In: 1400  Time Out: 1430  Total Billable Time: 30 minutes    SUBJECTIVE     Pt reports: She feels like she is doing really good and can be done with Physical Therapy.   She was compliant with home exercise program.  Response to previous treatment: improved knee range of motion and strength   Functional change: as above     Pain: 0/10  Location: left knee      OBJECTIVE      Objective Measures updated at progress report unless specified.     2025  30 second sit stand: 8  5 time sit stand: 20.86  TU.05  Treatment     Steph received the treatments listed below:      therapeutic exercises to develop strength, endurance, ROM, and flexibility for 10 minutes including:  Assessment as seen above     neuromuscular re-education activities to improve: Balance, Coordination, Kinesthetic, Sense, and Proprioception for 00 minutes. The following activities were included:    therapeutic activities to improve functional performance for 20 minutes, including:    Bridge  x10  Clamshells x10  Side lying hip abduction, x10   Standing hip extension x10  Standing hip abduction x10  Straight leg raise x10  LAQ x10  HEP education  DC education     gait training to improve functional mobility and safety for 00 minutes, including:      Patient Education and Home Exercises     Home Exercises Provided and Patient Education Provided     Education provided:   - HEP education   - POC education     Written Home Exercises Provided: yes. Exercises were reviewed and Steph was able to demonstrate them prior to the end of the session.  Steph demonstrated good  understanding of the education provided. See EMR under Patient Instructions for exercises provided during therapy sessions    Discharge Summary     Date of Last visit: 2025  Total Visits Received: 08        Assessment      At this time Steph has full and pain free knee range of motion. She has improved functional capacity and ability to return to her PLOF. She currently demonstrates some weakness in functional movements, such as sit to stands, but this strength can be improved with good adherence to her HEP. She provided a final HEP and was educated on following up with her MD as necessary.    Goals: MET    Discharge reason: Patient is now asymptomatic, Patient has met all of his/her goals, Patient has reached the maximum rehab potential for the  present time, and Patient requested discharge    Plan   This patient is discharged from Physical Therapy and is to cont with their HEP and MD follow up PRN.        Lalit Pinon PT, DPT          Current Participants as of 2/18/2025    Name Type Comments Contact Info    Zoran Hardwick MD Referring Provider  [phone]    Signature pending    Lalit Pinon PT, DPT Physical Therapist  [phone]                Sincerely,      Lalit Pinon PT, DPT  [facility]                                                            Dear Lalit Pinon PT, DPT,    RE: Ms. Steph Quigley, MRN: [id number]    I certify that I have reviewed the attached plan of care and agree to the details within.        ___________________________  ___________________________  Patient Printed Name    Patient Signed Name      ___________________________  Date and Time

## 2025-05-21 ENCOUNTER — HOSPITAL ENCOUNTER (OUTPATIENT)
Dept: RADIOLOGY | Facility: HOSPITAL | Age: 55
Discharge: HOME | End: 2025-05-21
Attending: STUDENT IN AN ORGANIZED HEALTH CARE EDUCATION/TRAINING PROGRAM
Payer: COMMERCIAL

## 2025-05-21 DIAGNOSIS — M79.605 PAIN OF LEFT LOWER EXTREMITY: ICD-10-CM

## 2025-05-21 PROCEDURE — 93971 EXTREMITY STUDY: CPT | Mod: TC,PO,LT

## 2025-05-21 PROCEDURE — 93971 EXTREMITY STUDY: CPT | Mod: 26,LT,, | Performed by: RADIOLOGY

## 2025-06-25 NOTE — ANESTHESIA POSTPROCEDURE EVALUATION
Anesthesia Post Evaluation    Patient: Steph Quigley    Procedure(s) Performed: Procedure(s) (LRB):  ARTHROPLASTY, KNEE, TOTAL (Left)    Final Anesthesia Type: spinal      Patient location during evaluation: PACU  Patient participation: Yes- Able to Participate  Level of consciousness: awake and alert  Post-procedure vital signs: reviewed and stable  Pain management: adequate  Airway patency: patent    PONV status at discharge: No PONV  Anesthetic complications: no      Cardiovascular status: blood pressure returned to baseline  Respiratory status: unassisted, spontaneous ventilation and room air  Hydration status: euvolemic  Follow-up not needed.          Vitals Value Taken Time   /58 12/16/24 1215   Temp 36.3 °C (97.3 °F) 12/16/24 1215   Pulse 86 12/16/24 1215   Resp 16 12/16/24 1215   SpO2 94 % 12/16/24 1215         Event Time   Out of Recovery 12:08:09         Pain/Marlen Score: Pain Rating Prior to Med Admin: 6 (12/16/2024 11:45 AM)  Marlen Score: 10 (12/16/2024 12:15 PM)           Patient informed

## (undated) DEVICE — SOL IRR SOD CHL .9% POUR

## (undated) DEVICE — COVER HD BACK TABLE 6FT

## (undated) DEVICE — Device

## (undated) DEVICE — PAD COLD THERAPY KNEE WRAP ON

## (undated) DEVICE — CUFF TOURNIQUET DL PRT

## (undated) DEVICE — SPONGE LAP 18X18 PREWASHED

## (undated) DEVICE — TRAY CATH 1-LYR URIMTR 16FR

## (undated) DEVICE — SOL NACL .9% 250ML INJ

## (undated) DEVICE — UNDERGLOVES BIOGEL PI SIZE 8

## (undated) DEVICE — PULSAVAC ZIMMER

## (undated) DEVICE — UNDERPAD ULTRASORB 300LB 30X36

## (undated) DEVICE — DRESSING XEROFORM NONADH 1X8IN

## (undated) DEVICE — SUT STRATAFIX PDS 1 CTX 18IN

## (undated) DEVICE — SOL NACL IRR 3000ML

## (undated) DEVICE — GLOVE BIOGEL SKINSENSE PI 7.0

## (undated) DEVICE — BLANKET HYPER ADULT 24X60IN

## (undated) DEVICE — UNDERGLOVES BIOGEL PI SZ 7 LF

## (undated) DEVICE — SUT ETHIBOND EXCEL 2 V37 30

## (undated) DEVICE — BANDAGE ESMARK ELASTIC ST 6X9

## (undated) DEVICE — BNDG COFLEX FOAM LF2 ST 6X5YD

## (undated) DEVICE — NDL 21GA X1 1/2 REG BEVEL

## (undated) DEVICE — DRAPE EXTREMITY ORTHOMAX

## (undated) DEVICE — DRAPE STERI INSTRUMENT 1018

## (undated) DEVICE — CONTAINER SPEC OR STRL 4.5OZ

## (undated) DEVICE — PRESSURIZER BN CEMENT NOZZLE

## (undated) DEVICE — SUT VICRYL PLUS 2-0 CT1 18

## (undated) DEVICE — TOWEL OR DISP STRL BLUE 4/PK

## (undated) DEVICE — ALCOHOL ISOPROPYL BLU 70% 16OZ

## (undated) DEVICE — SUT VICRYL+ 1 CTX 18IN VIOL

## (undated) DEVICE — SPONGE COTTON TRAY 4X4IN

## (undated) DEVICE — BLADE SAG DUAL 18MMX1.27MMX90M

## (undated) DEVICE — BANDAGE MATRIX HK LOOP 6IN 5YD

## (undated) DEVICE — BLADE REMANER PATELLA 41MM

## (undated) DEVICE — UNDERGLOVES BIOGEL PI SIZE 8.5

## (undated) DEVICE — KIT TOTAL HIP OPTIVAC

## (undated) DEVICE — TIP YANKAUERS BULB NO VENT

## (undated) DEVICE — SEALER BIPOLAR TISSUE 6.0

## (undated) DEVICE — SUT QUILL 2T11 36IN

## (undated) DEVICE — ELECTRODE REM PLYHSV RETURN 9

## (undated) DEVICE — PENCIL ELECTROSURG HOLST W/BLD

## (undated) DEVICE — HOOD T7 W/ PEEL AWAY LENS

## (undated) DEVICE — PAD CAST SPECIALIST STRL 6

## (undated) DEVICE — APPLICATOR CHLORAPREP ORN 26ML

## (undated) DEVICE — TRAY CATH FOL SIL URIMTR 16FR

## (undated) DEVICE — SYR SALINE FLSH PRFL STRL 10ML

## (undated) DEVICE — STAPLER SKIN PROXIMATE WIDE

## (undated) DEVICE — GLOVE BIOGEL PI ORTHO PRO 8.5

## (undated) DEVICE — GLOVE BIOGEL SKINSENSE PI 7.5

## (undated) DEVICE — SYR 0.9% NACL 10ML STERILE

## (undated) DEVICE — GLOVE BIOGEL SKINSENSE PI 8.5

## (undated) DEVICE — MASK FLYTE HOOD PEEL AWAY

## (undated) DEVICE — TOURNIQUET SB QC DP 34X4IN

## (undated) DEVICE — WRAP COBAN NL STRL 4INX5YD